# Patient Record
Sex: FEMALE | Race: ASIAN | NOT HISPANIC OR LATINO | Employment: FULL TIME | URBAN - METROPOLITAN AREA
[De-identification: names, ages, dates, MRNs, and addresses within clinical notes are randomized per-mention and may not be internally consistent; named-entity substitution may affect disease eponyms.]

---

## 2019-02-26 PROBLEM — J02.9 PHARYNGITIS: Status: ACTIVE | Noted: 2019-02-26

## 2019-02-26 PROBLEM — J03.90 EXUDATIVE TONSILLITIS: Status: ACTIVE | Noted: 2019-02-26

## 2023-10-23 ENCOUNTER — HOSPITAL ENCOUNTER (EMERGENCY)
Facility: HOSPITAL | Age: 48
Discharge: HOME | End: 2023-10-23
Attending: EMERGENCY MEDICINE | Admitting: EMERGENCY MEDICINE
Payer: COMMERCIAL

## 2023-10-23 ENCOUNTER — APPOINTMENT (OUTPATIENT)
Dept: CT IMAGING | Facility: HOSPITAL | Age: 48
End: 2023-10-23
Payer: COMMERCIAL

## 2023-10-23 VITALS
BODY MASS INDEX: 41.41 KG/M2 | DIASTOLIC BLOOD PRESSURE: 86 MMHG | RESPIRATION RATE: 18 BRPM | SYSTOLIC BLOOD PRESSURE: 132 MMHG | WEIGHT: 233.69 LBS | TEMPERATURE: 97.7 F | HEIGHT: 63 IN | OXYGEN SATURATION: 95 % | HEART RATE: 88 BPM

## 2023-10-23 DIAGNOSIS — N20.0 KIDNEY STONE: Primary | ICD-10-CM

## 2023-10-23 LAB
ALBUMIN SERPL-MCNC: 3.9 G/DL (ref 3.5–5.2)
ALBUMIN/GLOB SERPL: 1.2 G/DL
ALP SERPL-CCNC: 68 U/L (ref 39–117)
ALT SERPL W P-5'-P-CCNC: 20 U/L (ref 1–33)
ANION GAP SERPL CALCULATED.3IONS-SCNC: 11 MMOL/L (ref 5–15)
AST SERPL-CCNC: 21 U/L (ref 1–32)
BACTERIA UR QL AUTO: ABNORMAL /HPF
BASOPHILS # BLD AUTO: 0.02 10*3/MM3 (ref 0–0.2)
BASOPHILS NFR BLD AUTO: 0.3 % (ref 0–1.5)
BILIRUB SERPL-MCNC: 0.3 MG/DL (ref 0–1.2)
BILIRUB UR QL STRIP: NEGATIVE
BUN SERPL-MCNC: 13 MG/DL (ref 6–20)
BUN/CREAT SERPL: 18.3 (ref 7–25)
CALCIUM SPEC-SCNC: 9.1 MG/DL (ref 8.6–10.5)
CHLORIDE SERPL-SCNC: 107 MMOL/L (ref 98–107)
CLARITY UR: ABNORMAL
CO2 SERPL-SCNC: 23 MMOL/L (ref 22–29)
COLOR UR: YELLOW
CREAT SERPL-MCNC: 0.71 MG/DL (ref 0.57–1)
D-LACTATE SERPL-SCNC: 6.4 MMOL/L (ref 0.5–2)
DEPRECATED RDW RBC AUTO: 44 FL (ref 37–54)
EGFRCR SERPLBLD CKD-EPI 2021: 103.7 ML/MIN/1.73
EOSINOPHIL # BLD AUTO: 0 10*3/MM3 (ref 0–0.4)
EOSINOPHIL NFR BLD AUTO: 0 % (ref 0.3–6.2)
ERYTHROCYTE [DISTWIDTH] IN BLOOD BY AUTOMATED COUNT: 13.2 % (ref 12.3–15.4)
GLOBULIN UR ELPH-MCNC: 3.2 GM/DL
GLUCOSE SERPL-MCNC: 120 MG/DL (ref 65–99)
GLUCOSE UR STRIP-MCNC: NEGATIVE MG/DL
HCT VFR BLD AUTO: 36.8 % (ref 34–46.6)
HGB BLD-MCNC: 12.2 G/DL (ref 12–15.9)
HGB UR QL STRIP.AUTO: ABNORMAL
HOLD SPECIMEN: NORMAL
HYALINE CASTS UR QL AUTO: ABNORMAL /LPF
IMM GRANULOCYTES # BLD AUTO: 0.04 10*3/MM3 (ref 0–0.05)
IMM GRANULOCYTES NFR BLD AUTO: 0.6 % (ref 0–0.5)
KETONES UR QL STRIP: ABNORMAL
LEUKOCYTE ESTERASE UR QL STRIP.AUTO: NEGATIVE
LIPASE SERPL-CCNC: 29 U/L (ref 13–60)
LYMPHOCYTES # BLD AUTO: 0.65 10*3/MM3 (ref 0.7–3.1)
LYMPHOCYTES NFR BLD AUTO: 9.5 % (ref 19.6–45.3)
MCH RBC QN AUTO: 30.3 PG (ref 26.6–33)
MCHC RBC AUTO-ENTMCNC: 33.2 G/DL (ref 31.5–35.7)
MCV RBC AUTO: 91.5 FL (ref 79–97)
MONOCYTES # BLD AUTO: 0.32 10*3/MM3 (ref 0.1–0.9)
MONOCYTES NFR BLD AUTO: 4.7 % (ref 5–12)
NEUTROPHILS NFR BLD AUTO: 5.78 10*3/MM3 (ref 1.7–7)
NEUTROPHILS NFR BLD AUTO: 84.9 % (ref 42.7–76)
NITRITE UR QL STRIP: NEGATIVE
NRBC BLD AUTO-RTO: 0 /100 WBC (ref 0–0.2)
PH UR STRIP.AUTO: 5.5 [PH] (ref 5–8)
PLATELET # BLD AUTO: 159 10*3/MM3 (ref 140–450)
PMV BLD AUTO: 10.2 FL (ref 6–12)
POTASSIUM SERPL-SCNC: 3.4 MMOL/L (ref 3.5–5.2)
PROT SERPL-MCNC: 7.1 G/DL (ref 6–8.5)
PROT UR QL STRIP: ABNORMAL
RBC # BLD AUTO: 4.02 10*6/MM3 (ref 3.77–5.28)
RBC # UR STRIP: ABNORMAL /HPF
REF LAB TEST METHOD: ABNORMAL
SODIUM SERPL-SCNC: 141 MMOL/L (ref 136–145)
SP GR UR STRIP: 1.03 (ref 1–1.03)
SQUAMOUS #/AREA URNS HPF: ABNORMAL /HPF
UROBILINOGEN UR QL STRIP: ABNORMAL
WBC # UR STRIP: ABNORMAL /HPF
WBC NRBC COR # BLD: 6.81 10*3/MM3 (ref 3.4–10.8)
WHOLE BLOOD HOLD SPECIMEN: NORMAL

## 2023-10-23 PROCEDURE — 25010000002 ONDANSETRON PER 1 MG: Performed by: EMERGENCY MEDICINE

## 2023-10-23 PROCEDURE — 85025 COMPLETE CBC W/AUTO DIFF WBC: CPT | Performed by: EMERGENCY MEDICINE

## 2023-10-23 PROCEDURE — 96374 THER/PROPH/DIAG INJ IV PUSH: CPT

## 2023-10-23 PROCEDURE — 83605 ASSAY OF LACTIC ACID: CPT | Performed by: EMERGENCY MEDICINE

## 2023-10-23 PROCEDURE — 25010000002 MORPHINE PER 10 MG: Performed by: EMERGENCY MEDICINE

## 2023-10-23 PROCEDURE — 81001 URINALYSIS AUTO W/SCOPE: CPT | Performed by: EMERGENCY MEDICINE

## 2023-10-23 PROCEDURE — 83690 ASSAY OF LIPASE: CPT | Performed by: EMERGENCY MEDICINE

## 2023-10-23 PROCEDURE — 96375 TX/PRO/DX INJ NEW DRUG ADDON: CPT

## 2023-10-23 PROCEDURE — 96376 TX/PRO/DX INJ SAME DRUG ADON: CPT

## 2023-10-23 PROCEDURE — 99284 EMERGENCY DEPT VISIT MOD MDM: CPT

## 2023-10-23 PROCEDURE — 74176 CT ABD & PELVIS W/O CONTRAST: CPT

## 2023-10-23 PROCEDURE — 25810000003 LACTATED RINGERS SOLUTION: Performed by: EMERGENCY MEDICINE

## 2023-10-23 PROCEDURE — 80053 COMPREHEN METABOLIC PANEL: CPT | Performed by: EMERGENCY MEDICINE

## 2023-10-23 PROCEDURE — 36415 COLL VENOUS BLD VENIPUNCTURE: CPT

## 2023-10-23 PROCEDURE — 25010000002 KETOROLAC TROMETHAMINE PER 15 MG: Performed by: EMERGENCY MEDICINE

## 2023-10-23 RX ORDER — SODIUM CHLORIDE 9 MG/ML
10 INJECTION INTRAVENOUS AS NEEDED
Status: DISCONTINUED | OUTPATIENT
Start: 2023-10-23 | End: 2023-10-23 | Stop reason: HOSPADM

## 2023-10-23 RX ORDER — MORPHINE SULFATE 4 MG/ML
4 INJECTION, SOLUTION INTRAMUSCULAR; INTRAVENOUS ONCE
Status: COMPLETED | OUTPATIENT
Start: 2023-10-23 | End: 2023-10-23

## 2023-10-23 RX ORDER — ONDANSETRON 4 MG/1
4 TABLET, ORALLY DISINTEGRATING ORAL EVERY 6 HOURS PRN
Qty: 9 TABLET | Refills: 0 | Status: SHIPPED | OUTPATIENT
Start: 2023-10-23

## 2023-10-23 RX ORDER — ONDANSETRON 2 MG/ML
4 INJECTION INTRAMUSCULAR; INTRAVENOUS ONCE
Status: COMPLETED | OUTPATIENT
Start: 2023-10-23 | End: 2023-10-23

## 2023-10-23 RX ORDER — OXYCODONE HYDROCHLORIDE 5 MG/1
5 TABLET ORAL EVERY 6 HOURS PRN
Qty: 12 TABLET | Refills: 0 | Status: SHIPPED | OUTPATIENT
Start: 2023-10-23 | End: 2023-10-26

## 2023-10-23 RX ORDER — TAMSULOSIN HYDROCHLORIDE 0.4 MG/1
1 CAPSULE ORAL DAILY
Qty: 30 CAPSULE | Refills: 0 | Status: SHIPPED | OUTPATIENT
Start: 2023-10-23

## 2023-10-23 RX ORDER — KETOROLAC TROMETHAMINE 15 MG/ML
15 INJECTION, SOLUTION INTRAMUSCULAR; INTRAVENOUS ONCE
Status: COMPLETED | OUTPATIENT
Start: 2023-10-23 | End: 2023-10-23

## 2023-10-23 RX ADMIN — MORPHINE SULFATE 4 MG: 4 INJECTION, SOLUTION INTRAMUSCULAR; INTRAVENOUS at 06:37

## 2023-10-23 RX ADMIN — KETOROLAC TROMETHAMINE 15 MG: 15 INJECTION, SOLUTION INTRAMUSCULAR; INTRAVENOUS at 04:13

## 2023-10-23 RX ADMIN — MORPHINE SULFATE 4 MG: 4 INJECTION, SOLUTION INTRAMUSCULAR; INTRAVENOUS at 03:42

## 2023-10-23 RX ADMIN — SODIUM CHLORIDE, POTASSIUM CHLORIDE, SODIUM LACTATE AND CALCIUM CHLORIDE 1000 ML: 600; 310; 30; 20 INJECTION, SOLUTION INTRAVENOUS at 04:13

## 2023-10-23 RX ADMIN — ONDANSETRON 4 MG: 2 INJECTION INTRAMUSCULAR; INTRAVENOUS at 03:42

## 2023-10-23 NOTE — Clinical Note
[facility] EMERGENCY DEPARTMENT  [address]  [facility] [address]  Phone: [phone]    Bia Sims was seen and treated in our emergency department on 10/23/2023.  She may return to work on 10/25/2023.         Thank you for choosing [facility].    Lupillo Thorne MD

## 2023-10-23 NOTE — Clinical Note
[facility] EMERGENCY DEPARTMENT  [address]  [facility] [address]  Phone: [phone]    Bia Sims was seen and treated in our emergency department on 10/23/2023.  She may return to work on 10/25/2023.         Thank you for choosing [facility].    Lupillo Thorne MD

## 2023-10-23 NOTE — DISCHARGE INSTRUCTIONS
Today you were diagnosed with a kidney stone in the emergency department.  It is likely that the stone will pass through your urine stream.  Until it does you are likely to have episodic pain, nausea.   I recommend you take Tylenol 650 mg every 6 hours and ibuprofen 400 mg every 6 hours as needed for pain unless you have a contraindication to these medications  Take prescribed oxycodone as needed for severe episodes of pain.  Take prescribed ondansetron as needed for nausea or vomiting.  Take prescribed tamsulosin to help stone pass.  Call to schedule followup appointment with a Urologist.  Return to the ER as needed for new or worsening symptoms, especially if you develop signs of an infection.

## 2023-10-23 NOTE — ED PROVIDER NOTES
Subjective   History of Present Illness  Patient presents for evaluation of acute onset left-sided flank pain that is severe, waxing and waning over the past couple of hours.  She has had nausea and vomiting.  History of similar symptoms a couple of decades ago unclear cause at that time.  No dysuria, hematuria, fevers.    History provided by:  Patient      Review of Systems    No past medical history on file.    Allergies   Allergen Reactions    Vancomycin Anaphylaxis       No past surgical history on file.    No family history on file.    Social History     Socioeconomic History    Marital status:    Tobacco Use    Smoking status: Never   Substance and Sexual Activity    Alcohol use: No    Drug use: Defer    Sexual activity: Defer           Objective   Physical Exam  Constitutional:       General: She is not in acute distress.  HENT:      Head: Normocephalic and atraumatic.   Eyes:      Conjunctiva/sclera: Conjunctivae normal.      Pupils: Pupils are equal, round, and reactive to light.   Cardiovascular:      Rate and Rhythm: Normal rate and regular rhythm.      Pulses: Normal pulses.      Heart sounds: No murmur heard.     No gallop.   Pulmonary:      Effort: Pulmonary effort is normal. No respiratory distress.   Abdominal:      General: Abdomen is flat. There is no distension.      Tenderness: There is no abdominal tenderness. There is left CVA tenderness. There is no right CVA tenderness.   Musculoskeletal:         General: No swelling or deformity. Normal range of motion.   Skin:     General: Skin is warm and dry.      Capillary Refill: Capillary refill takes less than 2 seconds.   Neurological:      General: No focal deficit present.      Mental Status: She is alert and oriented to person, place, and time.   Psychiatric:         Mood and Affect: Mood normal.         Behavior: Behavior normal.         Procedures           ED Course                                           Medical Decision  Making  Differential diagnosis includes kidney stone, urinary tract infection, diverticulitis.  Lab and imaging studies conducted.  1 L IV fluid bolus, 4 mg IV morphine, 4 mg IV Zofran given for symptom control.    After multiple rounds of medications patient's pain is adequately controlled.  She is feeling much improved.  She was counseled on passage of stone, return precautions to the ER, follow-up with urologist.  Prescriptions were sent to her pharmacy.  She was discharged in good condition    Problems Addressed:  Kidney stone: complicated acute illness or injury    Amount and/or Complexity of Data Reviewed  Labs: ordered.     Details: Laboratory work-up independently interpreted by myself demonstrates no significant abnormalities.  Normal renal function.  No evidence of urinary tract infection  Radiology: ordered and independent interpretation performed.     Details: CT scan of the abdomen and pelvis independently interpreted by myself demonstrates 4 mm ureteral stone.    Risk  Prescription drug management.  Parenteral controlled substances.        Final diagnoses:   Kidney stone       ED Disposition  ED Disposition       ED Disposition   Discharge    Condition   Stable    Comment   --             Recent Results (from the past 24 hour(s))   Comprehensive Metabolic Panel    Collection Time: 10/23/23  4:20 AM    Specimen: Blood   Result Value Ref Range    Glucose 120 (H) 65 - 99 mg/dL    BUN 13 6 - 20 mg/dL    Creatinine 0.71 0.57 - 1.00 mg/dL    Sodium 141 136 - 145 mmol/L    Potassium 3.4 (L) 3.5 - 5.2 mmol/L    Chloride 107 98 - 107 mmol/L    CO2 23.0 22.0 - 29.0 mmol/L    Calcium 9.1 8.6 - 10.5 mg/dL    Total Protein 7.1 6.0 - 8.5 g/dL    Albumin 3.9 3.5 - 5.2 g/dL    ALT (SGPT) 20 1 - 33 U/L    AST (SGOT) 21 1 - 32 U/L    Alkaline Phosphatase 68 39 - 117 U/L    Total Bilirubin 0.3 0.0 - 1.2 mg/dL    Globulin 3.2 gm/dL    A/G Ratio 1.2 g/dL    BUN/Creatinine Ratio 18.3 7.0 - 25.0    Anion Gap 11.0 5.0 - 15.0  mmol/L    eGFR 103.7 >60.0 mL/min/1.73   Lipase    Collection Time: 10/23/23  4:20 AM    Specimen: Blood   Result Value Ref Range    Lipase 29 13 - 60 U/L   Green Top (Gel)    Collection Time: 10/23/23  4:20 AM   Result Value Ref Range    Extra Tube Hold for add-ons.    Lavender Top    Collection Time: 10/23/23  4:20 AM   Result Value Ref Range    Extra Tube hold for add-on    Gold Top - SST    Collection Time: 10/23/23  4:20 AM   Result Value Ref Range    Extra Tube Hold for add-ons.    CBC Auto Differential    Collection Time: 10/23/23  5:01 AM    Specimen: Blood   Result Value Ref Range    WBC 6.81 3.40 - 10.80 10*3/mm3    RBC 4.02 3.77 - 5.28 10*6/mm3    Hemoglobin 12.2 12.0 - 15.9 g/dL    Hematocrit 36.8 34.0 - 46.6 %    MCV 91.5 79.0 - 97.0 fL    MCH 30.3 26.6 - 33.0 pg    MCHC 33.2 31.5 - 35.7 g/dL    RDW 13.2 12.3 - 15.4 %    RDW-SD 44.0 37.0 - 54.0 fl    MPV 10.2 6.0 - 12.0 fL    Platelets 159 140 - 450 10*3/mm3    Neutrophil % 84.9 (H) 42.7 - 76.0 %    Lymphocyte % 9.5 (L) 19.6 - 45.3 %    Monocyte % 4.7 (L) 5.0 - 12.0 %    Eosinophil % 0.0 (L) 0.3 - 6.2 %    Basophil % 0.3 0.0 - 1.5 %    Immature Grans % 0.6 (H) 0.0 - 0.5 %    Neutrophils, Absolute 5.78 1.70 - 7.00 10*3/mm3    Lymphocytes, Absolute 0.65 (L) 0.70 - 3.10 10*3/mm3    Monocytes, Absolute 0.32 0.10 - 0.90 10*3/mm3    Eosinophils, Absolute 0.00 0.00 - 0.40 10*3/mm3    Basophils, Absolute 0.02 0.00 - 0.20 10*3/mm3    Immature Grans, Absolute 0.04 0.00 - 0.05 10*3/mm3    nRBC 0.0 0.0 - 0.2 /100 WBC   Urinalysis With Microscopic If Indicated (No Culture) - Urine, Clean Catch    Collection Time: 10/23/23  5:47 AM    Specimen: Urine, Clean Catch   Result Value Ref Range    Color, UA Yellow Yellow, Straw    Appearance, UA Cloudy (A) Clear    pH, UA 5.5 5.0 - 8.0    Specific Gravity, UA 1.029 1.001 - 1.030    Glucose, UA Negative Negative    Ketones, UA Trace (A) Negative    Bilirubin, UA Negative Negative    Blood, UA Large (3+) (A) Negative     "Protein, UA 30 mg/dL (1+) (A) Negative    Leuk Esterase, UA Negative Negative    Nitrite, UA Negative Negative    Urobilinogen, UA 0.2 E.U./dL 0.2 - 1.0 E.U./dL   Urinalysis, Microscopic Only - Urine, Clean Catch    Collection Time: 10/23/23  5:47 AM    Specimen: Urine, Clean Catch   Result Value Ref Range    RBC, UA 0-2 None Seen, 0-2 /HPF    WBC, UA 3-5 (A) None Seen, 0-2 /HPF    Bacteria, UA None Seen None Seen, Trace /HPF    Squamous Epithelial Cells, UA 0-2 None Seen, 0-2 /HPF    Hyaline Casts, UA 7-12 0 - 6 /LPF    Methodology Automated Microscopy      Note: In addition to lab results from this visit, the labs listed above may include labs taken at another facility or during a different encounter within the last 24 hours. Please correlate lab times with ED admission and discharge times for further clarification of the services performed during this visit.    CT Abdomen Pelvis Without Contrast   Final Result   Impression:   Mild left hydronephrosis secondary to a 4 mm left UPJ stone.                  Electronically Signed: Oleksandr Lemus MD     10/23/2023 3:38 AM EDT     Workstation ID: [id number]        Vitals:    10/23/23 0304 10/23/23 0305 10/23/23 0307 10/23/23 0630   BP:    132/86   Pulse: 82   88   Resp:   17 18   Temp:  97.7 °F (36.5 °C)     TempSrc:  Oral     SpO2: 100%   95%   Weight:  106 kg (233 lb 11 oz)     Height:  160 cm (63\")       Medications   Sodium Chloride (PF) 0.9 % 10 mL (has no administration in time range)   lactated ringers bolus 1,000 mL (1,000 mL Intravenous New Bag 10/23/23 0413)   ondansetron (ZOFRAN) injection 4 mg (4 mg Intravenous Given 10/23/23 0342)   Morphine sulfate (PF) injection 4 mg (4 mg Intravenous Given 10/23/23 0342)   ketorolac (TORADOL) injection 15 mg (15 mg Intravenous Given 10/23/23 0413)   Morphine sulfate (PF) injection 4 mg (4 mg Intravenous Given 10/23/23 0637)     ECG/EMG Results (last 24 hours)       ** No results found for the last 24 hours. **          No " orders to display           Pradeep Cerad MD  [address]  [address]  [place] [id number] [phone]               Medication List        New Prescriptions      ondansetron ODT 4 MG disintegrating tablet  Commonly known as: ZOFRAN-ODT  Place 1 tablet on the tongue Every 6 (Six) Hours As Needed for Nausea or Vomiting.     oxyCODONE 5 MG immediate release tablet  Commonly known as: Roxicodone  Take 1 tablet by mouth Every 6 (Six) Hours As Needed for Severe Pain for up to 3 days.     tamsulosin 0.4 MG capsule 24 hr capsule  Commonly known as: FLOMAX  Take 1 capsule by mouth Daily.               Where to Get Your Medications        These medications were sent to [facility] [id number] - [place], [place] - [address] AT [facility] [address] - [phone]  - [phone] FX  [address] [facility] [address] [facility] [address]      Phone: [phone]   ondansetron ODT 4 MG disintegrating tablet  oxyCODONE 5 MG immediate release tablet  tamsulosin 0.4 MG capsule 24 hr capsule            Lupillo Thorne MD  10/23/23 0607

## 2023-11-26 ENCOUNTER — APPOINTMENT (OUTPATIENT)
Dept: CT IMAGING | Facility: HOSPITAL | Age: 48
End: 2023-11-26
Payer: COMMERCIAL

## 2023-11-26 ENCOUNTER — HOSPITAL ENCOUNTER (OUTPATIENT)
Facility: HOSPITAL | Age: 48
Discharge: HOME | End: 2023-11-28
Attending: EMERGENCY MEDICINE | Admitting: INTERNAL MEDICINE
Payer: COMMERCIAL

## 2023-11-26 DIAGNOSIS — Z74.09 IMPAIRED FUNCTIONAL MOBILITY, BALANCE, GAIT, AND ENDURANCE: ICD-10-CM

## 2023-11-26 DIAGNOSIS — R10.9 LEFT FLANK PAIN: Primary | ICD-10-CM

## 2023-11-26 DIAGNOSIS — R11.2 NAUSEA AND VOMITING, UNSPECIFIED VOMITING TYPE: ICD-10-CM

## 2023-11-26 DIAGNOSIS — R03.0 ELEVATED BLOOD PRESSURE READING: ICD-10-CM

## 2023-11-26 DIAGNOSIS — N20.1 URETEROLITHIASIS: ICD-10-CM

## 2023-11-26 DIAGNOSIS — N20.1 LEFT URETERAL STONE: ICD-10-CM

## 2023-11-26 LAB
ALBUMIN SERPL-MCNC: 4.5 G/DL (ref 3.5–5.2)
ALBUMIN/GLOB SERPL: 1.4 G/DL
ALP SERPL-CCNC: 83 U/L (ref 39–117)
ALT SERPL W P-5'-P-CCNC: 21 U/L (ref 1–33)
ANION GAP SERPL CALCULATED.3IONS-SCNC: 16 MMOL/L (ref 5–15)
AST SERPL-CCNC: 23 U/L (ref 1–32)
BASOPHILS # BLD AUTO: 0.04 10*3/MM3 (ref 0–0.2)
BASOPHILS NFR BLD AUTO: 0.3 % (ref 0–1.5)
BILIRUB SERPL-MCNC: 0.4 MG/DL (ref 0–1.2)
BUN SERPL-MCNC: 12 MG/DL (ref 6–20)
BUN/CREAT SERPL: 13.5 (ref 7–25)
CALCIUM SPEC-SCNC: 9.9 MG/DL (ref 8.6–10.5)
CHLORIDE SERPL-SCNC: 103 MMOL/L (ref 98–107)
CO2 SERPL-SCNC: 21 MMOL/L (ref 22–29)
CREAT SERPL-MCNC: 0.89 MG/DL (ref 0.57–1)
D-LACTATE SERPL-SCNC: 3.7 MMOL/L (ref 0.5–2)
DEPRECATED RDW RBC AUTO: 42 FL (ref 37–54)
EGFRCR SERPLBLD CKD-EPI 2021: 79.1 ML/MIN/1.73
EOSINOPHIL # BLD AUTO: 0.03 10*3/MM3 (ref 0–0.4)
EOSINOPHIL NFR BLD AUTO: 0.2 % (ref 0.3–6.2)
ERYTHROCYTE [DISTWIDTH] IN BLOOD BY AUTOMATED COUNT: 13 % (ref 12.3–15.4)
GLOBULIN UR ELPH-MCNC: 3.2 GM/DL
GLUCOSE SERPL-MCNC: 129 MG/DL (ref 65–99)
HCT VFR BLD AUTO: 43.5 % (ref 34–46.6)
HGB BLD-MCNC: 16 G/DL (ref 12–15.9)
HOLD SPECIMEN: NORMAL
HOLD SPECIMEN: NORMAL
IMM GRANULOCYTES # BLD AUTO: 0.04 10*3/MM3 (ref 0–0.05)
IMM GRANULOCYTES NFR BLD AUTO: 0.3 % (ref 0–0.5)
LIPASE SERPL-CCNC: 24 U/L (ref 13–60)
LYMPHOCYTES # BLD AUTO: 1.55 10*3/MM3 (ref 0.7–3.1)
LYMPHOCYTES NFR BLD AUTO: 12.8 % (ref 19.6–45.3)
MCH RBC QN AUTO: 32.6 PG (ref 26.6–33)
MCHC RBC AUTO-ENTMCNC: 36.8 G/DL (ref 31.5–35.7)
MCV RBC AUTO: 88.6 FL (ref 79–97)
MONOCYTES # BLD AUTO: 0.64 10*3/MM3 (ref 0.1–0.9)
MONOCYTES NFR BLD AUTO: 5.3 % (ref 5–12)
NEUTROPHILS NFR BLD AUTO: 81.1 % (ref 42.7–76)
NEUTROPHILS NFR BLD AUTO: 9.84 10*3/MM3 (ref 1.7–7)
NRBC BLD AUTO-RTO: 0 /100 WBC (ref 0–0.2)
PLATELET # BLD AUTO: 242 10*3/MM3 (ref 140–450)
PMV BLD AUTO: 9.9 FL (ref 6–12)
POTASSIUM SERPL-SCNC: 3.7 MMOL/L (ref 3.5–5.2)
PROT SERPL-MCNC: 7.7 G/DL (ref 6–8.5)
RBC # BLD AUTO: 4.91 10*6/MM3 (ref 3.77–5.28)
SODIUM SERPL-SCNC: 140 MMOL/L (ref 136–145)
WBC NRBC COR # BLD AUTO: 12.14 10*3/MM3 (ref 3.4–10.8)
WHOLE BLOOD HOLD COAG: NORMAL
WHOLE BLOOD HOLD SPECIMEN: NORMAL

## 2023-11-26 PROCEDURE — 96375 TX/PRO/DX INJ NEW DRUG ADDON: CPT

## 2023-11-26 PROCEDURE — 80053 COMPREHEN METABOLIC PANEL: CPT | Performed by: EMERGENCY MEDICINE

## 2023-11-26 PROCEDURE — 36415 COLL VENOUS BLD VENIPUNCTURE: CPT

## 2023-11-26 PROCEDURE — 99284 EMERGENCY DEPT VISIT MOD MDM: CPT

## 2023-11-26 PROCEDURE — 25010000002 KETOROLAC TROMETHAMINE PER 15 MG: Performed by: EMERGENCY MEDICINE

## 2023-11-26 PROCEDURE — 83605 ASSAY OF LACTIC ACID: CPT | Performed by: EMERGENCY MEDICINE

## 2023-11-26 PROCEDURE — 25010000002 MORPHINE PER 10 MG: Performed by: EMERGENCY MEDICINE

## 2023-11-26 PROCEDURE — 25810000003 SODIUM CHLORIDE 0.9 % SOLUTION: Performed by: EMERGENCY MEDICINE

## 2023-11-26 PROCEDURE — 81001 URINALYSIS AUTO W/SCOPE: CPT | Performed by: EMERGENCY MEDICINE

## 2023-11-26 PROCEDURE — 85025 COMPLETE CBC W/AUTO DIFF WBC: CPT | Performed by: EMERGENCY MEDICINE

## 2023-11-26 PROCEDURE — 87086 URINE CULTURE/COLONY COUNT: CPT | Performed by: PHYSICIAN ASSISTANT

## 2023-11-26 PROCEDURE — 74176 CT ABD & PELVIS W/O CONTRAST: CPT

## 2023-11-26 PROCEDURE — 25010000002 ONDANSETRON PER 1 MG: Performed by: EMERGENCY MEDICINE

## 2023-11-26 PROCEDURE — 96374 THER/PROPH/DIAG INJ IV PUSH: CPT

## 2023-11-26 PROCEDURE — 83690 ASSAY OF LIPASE: CPT | Performed by: EMERGENCY MEDICINE

## 2023-11-26 RX ORDER — ONDANSETRON 2 MG/ML
4 INJECTION INTRAMUSCULAR; INTRAVENOUS ONCE
Status: COMPLETED | OUTPATIENT
Start: 2023-11-26 | End: 2023-11-26

## 2023-11-26 RX ORDER — MORPHINE SULFATE 4 MG/ML
4 INJECTION, SOLUTION INTRAMUSCULAR; INTRAVENOUS ONCE
Status: COMPLETED | OUTPATIENT
Start: 2023-11-26 | End: 2023-11-26

## 2023-11-26 RX ORDER — SODIUM CHLORIDE 9 MG/ML
10 INJECTION INTRAVENOUS AS NEEDED
Status: DISCONTINUED | OUTPATIENT
Start: 2023-11-26 | End: 2023-11-28 | Stop reason: HOSPADM

## 2023-11-26 RX ORDER — KETOROLAC TROMETHAMINE 30 MG/ML
30 INJECTION, SOLUTION INTRAMUSCULAR; INTRAVENOUS ONCE
Status: COMPLETED | OUTPATIENT
Start: 2023-11-26 | End: 2023-11-26

## 2023-11-26 RX ADMIN — SODIUM CHLORIDE 1000 ML: 9 INJECTION, SOLUTION INTRAVENOUS at 22:18

## 2023-11-26 RX ADMIN — KETOROLAC TROMETHAMINE 30 MG: 30 INJECTION, SOLUTION INTRAMUSCULAR; INTRAVENOUS at 22:18

## 2023-11-26 RX ADMIN — MORPHINE SULFATE 4 MG: 4 INJECTION, SOLUTION INTRAMUSCULAR; INTRAVENOUS at 22:18

## 2023-11-26 RX ADMIN — ONDANSETRON 4 MG: 2 INJECTION INTRAMUSCULAR; INTRAVENOUS at 22:18

## 2023-11-26 NOTE — Clinical Note
Level of Care: Telemetry [5]   Diagnosis: Left ureteral stone [2591325]   Admitting Physician: CHRISTA FITCH III [[id number]]   Attending Physician: CHRISTA FITCH III [[id number]]   Bed Request Comments: tele obs (CDU)

## 2023-11-27 ENCOUNTER — ANESTHESIA (OUTPATIENT)
Dept: PERIOP | Facility: HOSPITAL | Age: 48
End: 2023-11-27
Payer: COMMERCIAL

## 2023-11-27 ENCOUNTER — APPOINTMENT (OUTPATIENT)
Dept: GENERAL RADIOLOGY | Facility: HOSPITAL | Age: 48
End: 2023-11-27
Payer: COMMERCIAL

## 2023-11-27 ENCOUNTER — ANESTHESIA EVENT (OUTPATIENT)
Dept: PERIOP | Facility: HOSPITAL | Age: 48
End: 2023-11-27
Payer: COMMERCIAL

## 2023-11-27 PROBLEM — F32.A ANXIETY AND DEPRESSION: Status: ACTIVE | Noted: 2023-11-27

## 2023-11-27 PROBLEM — E87.20 LACTIC ACIDOSIS: Status: ACTIVE | Noted: 2023-11-27

## 2023-11-27 PROBLEM — Z85.3 HISTORY OF BREAST CANCER: Status: ACTIVE | Noted: 2023-11-27

## 2023-11-27 PROBLEM — G47.33 OSA (OBSTRUCTIVE SLEEP APNEA): Status: ACTIVE | Noted: 2023-11-27

## 2023-11-27 PROBLEM — F41.9 ANXIETY AND DEPRESSION: Status: ACTIVE | Noted: 2023-11-27

## 2023-11-27 PROBLEM — R09.02 HYPOXIA: Status: ACTIVE | Noted: 2023-11-27

## 2023-11-27 PROBLEM — N13.9 OBSTRUCTIVE UROPATHY: Status: ACTIVE | Noted: 2023-11-27

## 2023-11-27 PROBLEM — N20.1 LEFT URETERAL STONE: Status: ACTIVE | Noted: 2023-11-27

## 2023-11-27 LAB
ANION GAP SERPL CALCULATED.3IONS-SCNC: 11 MMOL/L (ref 5–15)
B-HCG UR QL: NEGATIVE
BACTERIA UR QL AUTO: ABNORMAL /HPF
BILIRUB UR QL STRIP: NEGATIVE
BUN SERPL-MCNC: 10 MG/DL (ref 6–20)
BUN/CREAT SERPL: 13.9 (ref 7–25)
CALCIUM SPEC-SCNC: 8.4 MG/DL (ref 8.6–10.5)
CHLORIDE SERPL-SCNC: 108 MMOL/L (ref 98–107)
CLARITY UR: CLEAR
CO2 SERPL-SCNC: 22 MMOL/L (ref 22–29)
COD CRY URNS QL: ABNORMAL /HPF
COLOR UR: YELLOW
CREAT SERPL-MCNC: 0.72 MG/DL (ref 0.57–1)
D-LACTATE SERPL-SCNC: 1.3 MMOL/L (ref 0.5–2)
D-LACTATE SERPL-SCNC: 2.4 MMOL/L (ref 0.5–2)
D-LACTATE SERPL-SCNC: 2.6 MMOL/L (ref 0.5–2)
D-LACTATE SERPL-SCNC: 2.7 MMOL/L (ref 0.5–2)
DEPRECATED RDW RBC AUTO: 43.6 FL (ref 37–54)
EGFRCR SERPLBLD CKD-EPI 2021: 102 ML/MIN/1.73
ERYTHROCYTE [DISTWIDTH] IN BLOOD BY AUTOMATED COUNT: 13.1 % (ref 12.3–15.4)
EXPIRATION DATE: NORMAL
GLUCOSE SERPL-MCNC: 113 MG/DL (ref 65–99)
GLUCOSE UR STRIP-MCNC: NEGATIVE MG/DL
HCT VFR BLD AUTO: 37.3 % (ref 34–46.6)
HGB BLD-MCNC: 12.4 G/DL (ref 12–15.9)
HGB UR QL STRIP.AUTO: ABNORMAL
HOLD SPECIMEN: NORMAL
HYALINE CASTS UR QL AUTO: ABNORMAL /LPF
INTERNAL NEGATIVE CONTROL: NORMAL
INTERNAL POSITIVE CONTROL: NORMAL
KETONES UR QL STRIP: ABNORMAL
LEUKOCYTE ESTERASE UR QL STRIP.AUTO: NEGATIVE
Lab: NORMAL
MCH RBC QN AUTO: 30.3 PG (ref 26.6–33)
MCHC RBC AUTO-ENTMCNC: 33.2 G/DL (ref 31.5–35.7)
MCV RBC AUTO: 91.2 FL (ref 79–97)
NITRITE UR QL STRIP: NEGATIVE
PH UR STRIP.AUTO: <=5 [PH] (ref 5–8)
PLATELET # BLD AUTO: 164 10*3/MM3 (ref 140–450)
PMV BLD AUTO: 9.8 FL (ref 6–12)
POTASSIUM SERPL-SCNC: 4.1 MMOL/L (ref 3.5–5.2)
PROT UR QL STRIP: ABNORMAL
RBC # BLD AUTO: 4.09 10*6/MM3 (ref 3.77–5.28)
RBC # UR STRIP: ABNORMAL /HPF
REF LAB TEST METHOD: ABNORMAL
SODIUM SERPL-SCNC: 141 MMOL/L (ref 136–145)
SP GR UR STRIP: 1.02 (ref 1–1.03)
SQUAMOUS #/AREA URNS HPF: ABNORMAL /HPF
UROBILINOGEN UR QL STRIP: ABNORMAL
WBC # UR STRIP: ABNORMAL /HPF
WBC NRBC COR # BLD AUTO: 7.21 10*3/MM3 (ref 3.4–10.8)

## 2023-11-27 PROCEDURE — G0378 HOSPITAL OBSERVATION PER HR: HCPCS

## 2023-11-27 PROCEDURE — 74420 UROGRAPHY RTRGR +-KUB: CPT | Performed by: UROLOGY

## 2023-11-27 PROCEDURE — 85027 COMPLETE CBC AUTOMATED: CPT | Performed by: PHYSICIAN ASSISTANT

## 2023-11-27 PROCEDURE — 25810000003 LACTATED RINGERS PER 1000 ML: Performed by: PHYSICIAN ASSISTANT

## 2023-11-27 PROCEDURE — 96376 TX/PRO/DX INJ SAME DRUG ADON: CPT

## 2023-11-27 PROCEDURE — 96361 HYDRATE IV INFUSION ADD-ON: CPT

## 2023-11-27 PROCEDURE — 25810000003 LACTATED RINGERS PER 1000 ML: Performed by: UROLOGY

## 2023-11-27 PROCEDURE — 80048 BASIC METABOLIC PNL TOTAL CA: CPT | Performed by: PHYSICIAN ASSISTANT

## 2023-11-27 PROCEDURE — 97161 PT EVAL LOW COMPLEX 20 MIN: CPT

## 2023-11-27 PROCEDURE — 93005 ELECTROCARDIOGRAM TRACING: CPT | Performed by: PHYSICIAN ASSISTANT

## 2023-11-27 PROCEDURE — C2617 STENT, NON-COR, TEM W/O DEL: HCPCS | Performed by: UROLOGY

## 2023-11-27 PROCEDURE — 52356 CYSTO/URETERO W/LITHOTRIPSY: CPT | Performed by: UROLOGY

## 2023-11-27 PROCEDURE — 99223 1ST HOSP IP/OBS HIGH 75: CPT | Performed by: PHYSICIAN ASSISTANT

## 2023-11-27 PROCEDURE — 25010000002 KETOROLAC TROMETHAMINE PER 15 MG: Performed by: PHYSICIAN ASSISTANT

## 2023-11-27 PROCEDURE — 81025 URINE PREGNANCY TEST: CPT | Performed by: ANESTHESIOLOGY

## 2023-11-27 PROCEDURE — C1769 GUIDE WIRE: HCPCS | Performed by: UROLOGY

## 2023-11-27 PROCEDURE — 25010000002 METOCLOPRAMIDE PER 10 MG: Performed by: EMERGENCY MEDICINE

## 2023-11-27 PROCEDURE — 25010000002 MORPHINE PER 10 MG: Performed by: UROLOGY

## 2023-11-27 PROCEDURE — 99222 1ST HOSP IP/OBS MODERATE 55: CPT | Performed by: UROLOGY

## 2023-11-27 PROCEDURE — 76000 FLUOROSCOPY <1 HR PHYS/QHP: CPT

## 2023-11-27 PROCEDURE — 93010 ELECTROCARDIOGRAM REPORT: CPT | Performed by: INTERNAL MEDICINE

## 2023-11-27 PROCEDURE — 25010000002 DEXAMETHASONE PER 1 MG: Performed by: ANESTHESIOLOGY

## 2023-11-27 PROCEDURE — 25010000002 CEFAZOLIN PER 500 MG: Performed by: UROLOGY

## 2023-11-27 PROCEDURE — 25010000002 CEFAZOLIN IN DEXTROSE 2-4 GM/100ML-% SOLUTION: Performed by: ANESTHESIOLOGY

## 2023-11-27 PROCEDURE — 25010000002 GENTAMICIN PER 80 MG: Performed by: UROLOGY

## 2023-11-27 PROCEDURE — 25010000002 PROPOFOL 10 MG/ML EMULSION: Performed by: ANESTHESIOLOGY

## 2023-11-27 PROCEDURE — 25010000002 MORPHINE PER 10 MG: Performed by: INTERNAL MEDICINE

## 2023-11-27 PROCEDURE — 96375 TX/PRO/DX INJ NEW DRUG ADDON: CPT

## 2023-11-27 PROCEDURE — 71045 X-RAY EXAM CHEST 1 VIEW: CPT

## 2023-11-27 PROCEDURE — 83605 ASSAY OF LACTIC ACID: CPT | Performed by: EMERGENCY MEDICINE

## 2023-11-27 PROCEDURE — 25810000003 LACTATED RINGERS PER 1000 ML: Performed by: INTERNAL MEDICINE

## 2023-11-27 PROCEDURE — 87040 BLOOD CULTURE FOR BACTERIA: CPT | Performed by: PHYSICIAN ASSISTANT

## 2023-11-27 PROCEDURE — 25810000003 LACTATED RINGERS SOLUTION: Performed by: INTERNAL MEDICINE

## 2023-11-27 PROCEDURE — 25010000002 HYDROMORPHONE PER 4 MG: Performed by: EMERGENCY MEDICINE

## 2023-11-27 PROCEDURE — 25810000003 LACTATED RINGERS PER 1000 ML: Performed by: ANESTHESIOLOGY

## 2023-11-27 PROCEDURE — 25810000003 SODIUM CHLORIDE 0.9 % SOLUTION: Performed by: EMERGENCY MEDICINE

## 2023-11-27 PROCEDURE — 25510000001 IOPAMIDOL 61 % SOLUTION 50 ML VIAL: Performed by: UROLOGY

## 2023-11-27 DEVICE — URETERAL STENT
Type: IMPLANTABLE DEVICE | Site: URETER | Status: FUNCTIONAL
Brand: PERCUFLEX™ PLUS

## 2023-11-27 RX ORDER — MAGNESIUM HYDROXIDE 1200 MG/15ML
LIQUID ORAL AS NEEDED
Status: DISCONTINUED | OUTPATIENT
Start: 2023-11-27 | End: 2023-11-27 | Stop reason: HOSPADM

## 2023-11-27 RX ORDER — BISACODYL 5 MG/1
5 TABLET, DELAYED RELEASE ORAL DAILY PRN
Status: DISCONTINUED | OUTPATIENT
Start: 2023-11-27 | End: 2023-11-28 | Stop reason: HOSPADM

## 2023-11-27 RX ORDER — MORPHINE SULFATE 4 MG/ML
3 INJECTION, SOLUTION INTRAMUSCULAR; INTRAVENOUS
Status: DISCONTINUED | OUTPATIENT
Start: 2023-11-27 | End: 2023-11-28 | Stop reason: HOSPADM

## 2023-11-27 RX ORDER — LIDOCAINE HYDROCHLORIDE 20 MG/ML
JELLY TOPICAL AS NEEDED
Status: DISCONTINUED | OUTPATIENT
Start: 2023-11-27 | End: 2023-11-27 | Stop reason: HOSPADM

## 2023-11-27 RX ORDER — PHENAZOPYRIDINE HYDROCHLORIDE 100 MG/1
200 TABLET, FILM COATED ORAL EVERY 8 HOURS PRN
Status: DISCONTINUED | OUTPATIENT
Start: 2023-11-27 | End: 2023-11-28 | Stop reason: HOSPADM

## 2023-11-27 RX ORDER — TAMSULOSIN HYDROCHLORIDE 0.4 MG/1
0.4 CAPSULE ORAL DAILY
Status: DISCONTINUED | OUTPATIENT
Start: 2023-11-27 | End: 2023-11-28 | Stop reason: HOSPADM

## 2023-11-27 RX ORDER — METOCLOPRAMIDE HYDROCHLORIDE 5 MG/ML
10 INJECTION INTRAMUSCULAR; INTRAVENOUS ONCE
Status: COMPLETED | OUTPATIENT
Start: 2023-11-27 | End: 2023-11-27

## 2023-11-27 RX ORDER — FAMOTIDINE 20 MG/1
20 TABLET, FILM COATED ORAL
Status: COMPLETED | OUTPATIENT
Start: 2023-11-27 | End: 2023-11-27

## 2023-11-27 RX ORDER — SODIUM CHLORIDE 0.9 % (FLUSH) 0.9 %
10 SYRINGE (ML) INJECTION EVERY 12 HOURS SCHEDULED
Status: DISCONTINUED | OUTPATIENT
Start: 2023-11-27 | End: 2023-11-27 | Stop reason: HOSPADM

## 2023-11-27 RX ORDER — POLYETHYLENE GLYCOL 3350 17 G/17G
17 POWDER, FOR SOLUTION ORAL DAILY PRN
Status: DISCONTINUED | OUTPATIENT
Start: 2023-11-27 | End: 2023-11-28 | Stop reason: HOSPADM

## 2023-11-27 RX ORDER — MIDAZOLAM HYDROCHLORIDE 1 MG/ML
1 INJECTION INTRAMUSCULAR; INTRAVENOUS
Status: DISCONTINUED | OUTPATIENT
Start: 2023-11-27 | End: 2023-11-27 | Stop reason: HOSPADM

## 2023-11-27 RX ORDER — OXYBUTYNIN CHLORIDE 5 MG/1
5 TABLET ORAL EVERY 8 HOURS PRN
Status: DISCONTINUED | OUTPATIENT
Start: 2023-11-27 | End: 2023-11-28 | Stop reason: HOSPADM

## 2023-11-27 RX ORDER — LIDOCAINE HYDROCHLORIDE 10 MG/ML
INJECTION, SOLUTION EPIDURAL; INFILTRATION; INTRACAUDAL; PERINEURAL AS NEEDED
Status: DISCONTINUED | OUTPATIENT
Start: 2023-11-27 | End: 2023-11-27 | Stop reason: SURG

## 2023-11-27 RX ORDER — SODIUM CHLORIDE 0.9 % (FLUSH) 0.9 %
10 SYRINGE (ML) INJECTION AS NEEDED
Status: DISCONTINUED | OUTPATIENT
Start: 2023-11-27 | End: 2023-11-28 | Stop reason: HOSPADM

## 2023-11-27 RX ORDER — ONDANSETRON 2 MG/ML
4 INJECTION INTRAMUSCULAR; INTRAVENOUS EVERY 6 HOURS PRN
Status: DISCONTINUED | OUTPATIENT
Start: 2023-11-27 | End: 2023-11-28 | Stop reason: HOSPADM

## 2023-11-27 RX ORDER — SERTRALINE HYDROCHLORIDE 25 MG/1
75 TABLET, FILM COATED ORAL DAILY
COMMUNITY

## 2023-11-27 RX ORDER — AMOXICILLIN 250 MG
2 CAPSULE ORAL 2 TIMES DAILY
Status: DISCONTINUED | OUTPATIENT
Start: 2023-11-27 | End: 2023-11-28 | Stop reason: HOSPADM

## 2023-11-27 RX ORDER — SODIUM CHLORIDE 9 MG/ML
40 INJECTION, SOLUTION INTRAVENOUS AS NEEDED
Status: DISCONTINUED | OUTPATIENT
Start: 2023-11-27 | End: 2023-11-28 | Stop reason: HOSPADM

## 2023-11-27 RX ORDER — SODIUM CHLORIDE, SODIUM LACTATE, POTASSIUM CHLORIDE, CALCIUM CHLORIDE 600; 310; 30; 20 MG/100ML; MG/100ML; MG/100ML; MG/100ML
75 INJECTION, SOLUTION INTRAVENOUS CONTINUOUS
Status: DISCONTINUED | OUTPATIENT
Start: 2023-11-27 | End: 2023-11-27

## 2023-11-27 RX ORDER — PROPOFOL 10 MG/ML
VIAL (ML) INTRAVENOUS AS NEEDED
Status: DISCONTINUED | OUTPATIENT
Start: 2023-11-27 | End: 2023-11-27 | Stop reason: SURG

## 2023-11-27 RX ORDER — SODIUM CHLORIDE 0.9 % (FLUSH) 0.9 %
10 SYRINGE (ML) INJECTION EVERY 12 HOURS SCHEDULED
Status: DISCONTINUED | OUTPATIENT
Start: 2023-11-27 | End: 2023-11-28 | Stop reason: HOSPADM

## 2023-11-27 RX ORDER — SODIUM CHLORIDE, SODIUM LACTATE, POTASSIUM CHLORIDE, CALCIUM CHLORIDE 600; 310; 30; 20 MG/100ML; MG/100ML; MG/100ML; MG/100ML
9 INJECTION, SOLUTION INTRAVENOUS CONTINUOUS PRN
Status: DISCONTINUED | OUTPATIENT
Start: 2023-11-27 | End: 2023-11-28 | Stop reason: HOSPADM

## 2023-11-27 RX ORDER — KETOROLAC TROMETHAMINE 15 MG/ML
15 INJECTION, SOLUTION INTRAMUSCULAR; INTRAVENOUS EVERY 6 HOURS PRN
Status: DISCONTINUED | OUTPATIENT
Start: 2023-11-27 | End: 2023-11-28 | Stop reason: HOSPADM

## 2023-11-27 RX ORDER — FENTANYL CITRATE 50 UG/ML
50 INJECTION, SOLUTION INTRAMUSCULAR; INTRAVENOUS
Status: DISCONTINUED | OUTPATIENT
Start: 2023-11-27 | End: 2023-11-27 | Stop reason: HOSPADM

## 2023-11-27 RX ORDER — SODIUM CHLORIDE 9 MG/ML
40 INJECTION, SOLUTION INTRAVENOUS AS NEEDED
Status: DISCONTINUED | OUTPATIENT
Start: 2023-11-27 | End: 2023-11-27 | Stop reason: HOSPADM

## 2023-11-27 RX ORDER — DEXAMETHASONE SODIUM PHOSPHATE 4 MG/ML
INJECTION, SOLUTION INTRA-ARTICULAR; INTRALESIONAL; INTRAMUSCULAR; INTRAVENOUS; SOFT TISSUE AS NEEDED
Status: DISCONTINUED | OUTPATIENT
Start: 2023-11-27 | End: 2023-11-27 | Stop reason: SURG

## 2023-11-27 RX ORDER — SODIUM CHLORIDE, SODIUM LACTATE, POTASSIUM CHLORIDE, CALCIUM CHLORIDE 600; 310; 30; 20 MG/100ML; MG/100ML; MG/100ML; MG/100ML
100 INJECTION, SOLUTION INTRAVENOUS CONTINUOUS
Status: DISCONTINUED | OUTPATIENT
Start: 2023-11-27 | End: 2023-11-28 | Stop reason: HOSPADM

## 2023-11-27 RX ORDER — BISACODYL 10 MG
10 SUPPOSITORY, RECTAL RECTAL DAILY PRN
Status: DISCONTINUED | OUTPATIENT
Start: 2023-11-27 | End: 2023-11-28 | Stop reason: HOSPADM

## 2023-11-27 RX ORDER — KETOROLAC TROMETHAMINE 30 MG/ML
30 INJECTION, SOLUTION INTRAMUSCULAR; INTRAVENOUS EVERY 6 HOURS PRN
Status: DISCONTINUED | OUTPATIENT
Start: 2023-11-27 | End: 2023-11-27

## 2023-11-27 RX ORDER — ACETAMINOPHEN 325 MG/1
650 TABLET ORAL EVERY 4 HOURS PRN
Status: DISCONTINUED | OUTPATIENT
Start: 2023-11-27 | End: 2023-11-28 | Stop reason: HOSPADM

## 2023-11-27 RX ORDER — CEFAZOLIN SODIUM 2 G/100ML
INJECTION, SOLUTION INTRAVENOUS AS NEEDED
Status: DISCONTINUED | OUTPATIENT
Start: 2023-11-27 | End: 2023-11-27 | Stop reason: SURG

## 2023-11-27 RX ORDER — CHOLECALCIFEROL (VITAMIN D3) 125 MCG
5 CAPSULE ORAL NIGHTLY PRN
Status: DISCONTINUED | OUTPATIENT
Start: 2023-11-27 | End: 2023-11-28 | Stop reason: HOSPADM

## 2023-11-27 RX ORDER — HYDROMORPHONE HYDROCHLORIDE 1 MG/ML
0.5 INJECTION, SOLUTION INTRAMUSCULAR; INTRAVENOUS; SUBCUTANEOUS ONCE
Status: COMPLETED | OUTPATIENT
Start: 2023-11-27 | End: 2023-11-27

## 2023-11-27 RX ORDER — FAMOTIDINE 10 MG/ML
20 INJECTION, SOLUTION INTRAVENOUS
Status: COMPLETED | OUTPATIENT
Start: 2023-11-27 | End: 2023-11-27

## 2023-11-27 RX ORDER — HYDROMORPHONE HYDROCHLORIDE 1 MG/ML
0.5 INJECTION, SOLUTION INTRAMUSCULAR; INTRAVENOUS; SUBCUTANEOUS
Status: DISCONTINUED | OUTPATIENT
Start: 2023-11-27 | End: 2023-11-27 | Stop reason: HOSPADM

## 2023-11-27 RX ORDER — SODIUM CHLORIDE 0.9 % (FLUSH) 0.9 %
10 SYRINGE (ML) INJECTION AS NEEDED
Status: DISCONTINUED | OUTPATIENT
Start: 2023-11-27 | End: 2023-11-27 | Stop reason: HOSPADM

## 2023-11-27 RX ADMIN — HYDROMORPHONE HYDROCHLORIDE 0.5 MG: 1 INJECTION, SOLUTION INTRAMUSCULAR; INTRAVENOUS; SUBCUTANEOUS at 00:03

## 2023-11-27 RX ADMIN — DEXAMETHASONE SODIUM PHOSPHATE 4 MG: 4 INJECTION, SOLUTION INTRAMUSCULAR; INTRAVENOUS at 19:34

## 2023-11-27 RX ADMIN — MORPHINE SULFATE 3 MG: 4 INJECTION, SOLUTION INTRAMUSCULAR; INTRAVENOUS at 12:22

## 2023-11-27 RX ADMIN — Medication 10 ML: at 20:52

## 2023-11-27 RX ADMIN — KETOROLAC TROMETHAMINE 30 MG: 30 INJECTION, SOLUTION INTRAMUSCULAR at 05:48

## 2023-11-27 RX ADMIN — SODIUM CHLORIDE, POTASSIUM CHLORIDE, SODIUM LACTATE AND CALCIUM CHLORIDE 100 ML/HR: 600; 310; 30; 20 INJECTION, SOLUTION INTRAVENOUS at 20:52

## 2023-11-27 RX ADMIN — SODIUM CHLORIDE, POTASSIUM CHLORIDE, SODIUM LACTATE AND CALCIUM CHLORIDE 75 ML/HR: 600; 310; 30; 20 INJECTION, SOLUTION INTRAVENOUS at 01:44

## 2023-11-27 RX ADMIN — LIDOCAINE HYDROCHLORIDE 50 MG: 10 INJECTION, SOLUTION EPIDURAL; INFILTRATION; INTRACAUDAL; PERINEURAL at 19:30

## 2023-11-27 RX ADMIN — MORPHINE SULFATE 3 MG: 4 INJECTION, SOLUTION INTRAMUSCULAR; INTRAVENOUS at 06:04

## 2023-11-27 RX ADMIN — SODIUM CHLORIDE, POTASSIUM CHLORIDE, SODIUM LACTATE AND CALCIUM CHLORIDE 100 ML/HR: 600; 310; 30; 20 INJECTION, SOLUTION INTRAVENOUS at 09:30

## 2023-11-27 RX ADMIN — CEFAZOLIN SODIUM 2 G: 2 INJECTION, SOLUTION INTRAVENOUS at 19:30

## 2023-11-27 RX ADMIN — SODIUM CHLORIDE, POTASSIUM CHLORIDE, SODIUM LACTATE AND CALCIUM CHLORIDE 500 ML: 600; 310; 30; 20 INJECTION, SOLUTION INTRAVENOUS at 08:02

## 2023-11-27 RX ADMIN — SODIUM CHLORIDE, POTASSIUM CHLORIDE, SODIUM LACTATE AND CALCIUM CHLORIDE 100 ML/HR: 600; 310; 30; 20 INJECTION, SOLUTION INTRAVENOUS at 14:37

## 2023-11-27 RX ADMIN — SODIUM CHLORIDE, POTASSIUM CHLORIDE, SODIUM LACTATE AND CALCIUM CHLORIDE 9 ML/HR: 600; 310; 30; 20 INJECTION, SOLUTION INTRAVENOUS at 18:58

## 2023-11-27 RX ADMIN — SODIUM CHLORIDE 2000 MG: 900 INJECTION INTRAVENOUS at 21:52

## 2023-11-27 RX ADMIN — TAMSULOSIN HYDROCHLORIDE 0.4 MG: 0.4 CAPSULE ORAL at 01:41

## 2023-11-27 RX ADMIN — FAMOTIDINE 20 MG: 10 INJECTION INTRAVENOUS at 18:58

## 2023-11-27 RX ADMIN — SODIUM CHLORIDE 1000 ML: 9 INJECTION, SOLUTION INTRAVENOUS at 00:04

## 2023-11-27 RX ADMIN — PROPOFOL 200 MG: 10 INJECTION, EMULSION INTRAVENOUS at 19:30

## 2023-11-27 RX ADMIN — METOCLOPRAMIDE 10 MG: 5 INJECTION, SOLUTION INTRAMUSCULAR; INTRAVENOUS at 00:17

## 2023-11-27 RX ADMIN — Medication 10 ML: at 08:04

## 2023-11-27 NOTE — PROGRESS NOTES
[facility] Medicine Services  ADMISSION FOLLOW-UP NOTE          Patient admitted after midnight, H&P by my partner performed earlier on today's date reviewed.  Interim findings, labs, and charting also reviewed.        The [facility] Problem List has been managed and updated to include any new diagnoses:  Active Hospital Problems    Diagnosis  POA    **Obstructive uropathy [N13.9]  Yes    History of breast cancer [Z85.3]  Not Applicable    BYRON (obstructive sleep apnea) [G47.33]  Yes    Anxiety and depression [F41.9, F32.A]  Yes    Lactic acidosis [E87.20]  Yes    Left ureteral stone [N20.1]  Yes    Hypoxia [R09.02]  Yes      Resolved Hospital Problems   No resolved problems to display.     In summary, this is a 47 yo female w h/o remote kidney stone not requiring intervention who presented with left colicky pain. Found to have 7 mm stone w moderate hydronephrosis    ADDITIONAL PLAN:  - awaiting urologic intervention  - lactic acidosis 2/2 profuse vomiting resolved w IVF    Expected Discharge 11/28  Expected Discharge Date: 11/28/2023; Expected Discharge Time:      Susie Penaloza MD  11/27/23

## 2023-11-27 NOTE — CASE MANAGEMENT/SOCIAL WORK
Discharge Planning Assessment  [facility]     Patient Name: Bia Sims  MRN: [id number]  Today's Date: 11/27/2023    Admit Date: 11/26/2023        Discharge Needs Assessment    No documentation.                  Discharge Plan       Row Name 11/27/23 1724       Plan    Plan Comments The patient lives in [place] and has [employer] insurance coverage. Case management is following for discharge needs.    Final Discharge Disposition Code 01 - home or self-care                  Continued Care and Services - Admitted Since 11/26/2023    Coordination has not been started for this encounter.       Expected Discharge Date and Time       Expected Discharge Date Expected Discharge Time    Nov 28, 2023            Demographic Summary       Row Name 11/27/23 1723       General Information    Reason for Consult discharge planning    Preferred Language English       Contact Information    Permission Granted to Share Info With     Contact Information Obtained for                    Functional Status    No documentation.                  Psychosocial    No documentation.                  Abuse/Neglect    No documentation.                  Legal    No documentation.                  Substance Abuse    No documentation.                  Patient Forms    No documentation.                     DELMA [place]

## 2023-11-27 NOTE — CONSULTS
[facility]   Urology Consultation    Patient Name: Bia Sims  : 1975  MRN: [id number]  Primary Care Physician:  Provider, No Known  Date of admission: 2023    Subjective   Subjective     Chief Complaint: LEFT ureteral stone    HPI:    Bia Sims is a 48 y.o. female with a reported history of BYRON, left breast cancer, anxiety/depression and nephrolithiasis.  She presented to the [facility] ED yesterday with approximately 2 days of worsening left flank pain.  Associated with some nausea and scant hematuria.  Patient had presented to this facility's ED on  with similar symptoms.  At that time imaging had revealed a 4 mm left UPJ stone with mild hydronephrosis.  She had been discharged home with Flomax, oxycodone and Zofran as well as a strainer.  She had not followed up with urology as an outpatient.  Pain had improved, but then got much worse a couple of days ago.  Repeat CT yesterday revealed obstructing calculus in the left distal ureter measuring up to 7 mm with moderate proximal hydroureter ureteral nephrosis.  Her lactate was mildly elevated at 2.7.  Normal creatinine and white count.  Urinalysis revealed a large amount of blood with trace ketones and trace protein.  Otherwise unremarkable.  Due to the size of her stone and significant associated pain, she was subsequently admitted to the medicine service.  Urology consulted for further recommendations.    Review of Systems   All systems were reviewed and negative except for: Were noted in the HPI above.    Personal History     Past Medical History:   Diagnosis Date    Breast cancer     Left    Kidney stones     Sleep apnea        Past Surgical History:   Procedure Laterality Date    BREAST SURGERY         Family History: family history is not on file. Otherwise pertinent FHx was reviewed and not pertinent to current issue.    Social History:  reports that she has never smoked. She does not have any smokeless tobacco  history on file. She reports that she does not drink alcohol and does not use drugs.    Home Medications:  ondansetron ODT, sertraline, and tamsulosin      Allergies:  Allergies   Allergen Reactions    Vancomycin Anaphylaxis    Latex Rash       Objective   Objective     Vitals:   Temp:  [98.4 °F (36.9 °C)-99 °F (37.2 °C)] 98.4 °F (36.9 °C)  Heart Rate:  [] 90  Resp:  [10-18] 18  BP: (127-198)/() 127/77  Flow (L/min):  [2] 2  Physical Exam    Constitutional: Awake in bed, alert    Eyes: PERRLA, sclerae anicteric, no conjunctival injection   HENT: Normocephalic, atraumatic, mucous membranes moist   Neck: Supple, trachea midline   Respiratory:Equal chest rise, non-labored respirations    Cardiovascular: RRR, palpable radial pulses bilaterally   Gastrointestinal: Soft, nontender, non-distended, LEFT flank pain to palpation    Musculoskeletal: No bilateral ankle edema, no clubbing or cyanosis to extremities   Genitourinary: Deferred   Psychiatric: Appropriate affect, cooperative   Neurologic: Oriented x 3, Cranial Nerves grossly intact, speech clear   Skin: No rashes     Result Review    Result Review:  I have personally reviewed the results from the time of this admission to 11/27/2023 10:15 EST and agree with these findings:  [x]  Laboratory  [x]  Microbiology  []  Radiology  []  EKG/Telemetry   []  Cardiology/Vascular   []  Pathology  []  Old records  []  Other:        Assessment & Plan   Assessment / Plan     Brief Patient Summary:  Bia Sims is a 48 y.o. female who presents with severe left flank pain and scant gross hematuria in the setting of a LEFT 7 mm distal ureteral stone with moderate proximal hydroureteronephrosis.  Mild lactic acidosis otherwise afebrile with no leukocytosis and normal kidney function.  Blood and urine cultures currently pending.  Given the size of the stone and that this is her second presentation to this facility for stones, we recommend surgical intervention in the form  of cystoscopy with ureteral stone stent placement with possible ureteroscopy and laser lithotripsy.  R/B/A discussed.  Patient agrees to surgical intervention as described above.  We will plan to perform this later today.    Active Hospital Problems:  Active Hospital Problems    Diagnosis     **Obstructive uropathy     History of breast cancer     BYRON (obstructive sleep apnea)     Anxiety and depression     Lactic acidosis     Left ureteral stone     Hypoxia      Plan:   -OR today with Dr. Soto  -Consent  -Keep NPO  -All other plans per primary team  -Will follow  -d/w Dr. Soto.    DVT prophylaxis:  Mechanical DVT prophylaxis orders are present.    CODE STATUS:    Level Of Support Discussed With: Patient  Code Status (Patient has no pulse and is not breathing): CPR (Attempt to Resuscitate)  Medical Interventions (Patient has pulse or is breathing): Full Support    Admission Status:  I believe this patient meets inpt status.    Electronically signed by Brian Medina PA-C, 11/27/23, 10:15 AM EST.

## 2023-11-27 NOTE — THERAPY DISCHARGE NOTE
Patient Name: Bia Sims  : 1975    MRN: [id number]                              Today's Date: 2023       Admit Date: 2023    Visit Dx:     ICD-10-CM ICD-9-CM   1. Left flank pain  R10.9 789.09   2. Nausea and vomiting, unspecified vomiting type  R11.2 787.01   3. Ureterolithiasis  N20.1 592.1   4. Elevated blood pressure reading  R03.0 796.2   5. Impaired functional mobility, balance, gait, and endurance  Z74.09 V49.89     Patient Active Problem List   Diagnosis    Pharyngitis    Exudative tonsillitis    Obstructive uropathy    History of breast cancer    BYRON (obstructive sleep apnea)    Anxiety and depression    Lactic acidosis    Left ureteral stone    Hypoxia     Past Medical History:   Diagnosis Date    Breast cancer     Left    Kidney stones     Sleep apnea      Past Surgical History:   Procedure Laterality Date    BREAST SURGERY        General Information       Row Name 23 1501          Physical Therapy Time and Intention    Document Type discharge evaluation/summary  -SD     Mode of Treatment individual therapy;physical therapy  -SD       Row Name 23 1501          General Information    Patient Profile Reviewed yes  -SD     Prior Level of Function independent:;all household mobility;community mobility;gait;transfer;bed mobility;ADL's;work;using stairs  pt is a  and works at [employer]  -SD     Existing Precautions/Restrictions no known precautions/restrictions;NPO  -SD     Barriers to Rehab none identified  -SD       Row Name 23 1501          Living Environment    People in Home child(marlene), adult  -SD       Row Name 23 1501          Cognition    Orientation Status (Cognition) oriented x 4  -SD       Row Name 23 1501          Safety Issues, Functional Mobility    Comment, Safety Issues/Impairments (Mobility) No impairments or safety issues with mobility  -SD               User Key  (r) = Recorded By, (t) = Taken By, (c) = Cosigned By       Initials Name Provider Type    SD Khushi Esteban, PT Physical Therapist                   Mobility       Row Name 11/27/23 1624          Bed Mobility    Bed Mobility supine-sit  -SD     Supine-Sit [place] (Bed Mobility) independent  -SD     Comment, (Bed Mobility) pt able to perform without difficulty  -SD       Row Name 11/27/23 1624          Transfers    Comment, (Transfers) pt able to perform without difficulty  -SD       Row Name 11/27/23 1624          Sit-Stand Transfer    Sit-Stand [place] (Transfers) independent  -SD       Row Name 11/27/23 1624          Gait/Stairs (Locomotion)    [place] Level (Gait) independent  -SD     Distance in Feet (Gait) 900ft  -SD     Comment, (Gait/Stairs) Pt amb in hallway without assistive device with independence. Pt dem normal gait pattern and had no LOB.  -SD               User Key  (r) = Recorded By, (t) = Taken By, (c) = Cosigned By      Initials Name Provider Type    Khushi Clark, PT Physical Therapist                   Obj/Interventions       Row Name 11/27/23 1626          Range of Motion Comprehensive    General Range of Motion bilateral lower extremity ROM WNL  -SD       Row Name 11/27/23 1626          Strength Comprehensive (MMT)    General Manual Muscle Testing (MMT) Assessment no strength deficits identified  -SD       Row Name 11/27/23 1626          Balance    Balance Assessment sitting static balance;sitting dynamic balance;standing static balance;standing dynamic balance  -SD     Static Sitting Balance independent  -SD     Dynamic Sitting Balance independent  -SD     Position, Sitting Balance unsupported;sitting edge of bed  -SD     Static Standing Balance independent  -SD     Dynamic Standing Balance independent  -SD     Position/Device Used, Standing Balance unsupported  -SD     Balance Interventions occupation based/functional task;standing;dynamic;static;sitting  -SD     Comment, Balance ADL's in bathroom, LBD  -SD                User Key  (r) = Recorded By, (t) = Taken By, (c) = Cosigned By      Initials Name Provider Type    Khushi Clark, PT Physical Therapist                   Goals/Plan    No documentation.                  Clinical Impression       Row Name 11/27/23 1627          Pain    Pain Location - Side/Orientation Left  -SD     Pain Location - flank  -SD     Additional Documentation Pain Scale: FACES Pre/Post-Treatment (Group)  -SD       Row Name 11/27/23 1627          Pain Scale: FACES Pre/Post-Treatment    Pain: FACES Scale, Pretreatment 4-->hurts little more  -SD     Posttreatment Pain Rating 4-->hurts little more  -SD     Pre/Posttreatment Pain Comment premedicated  -SD       Row Name 11/27/23 1627          Plan of Care Review    Plan of Care Reviewed With patient  -SD     Outcome Evaluation Pt presents at baseline level of function and is independent with all mobility tasks. No further IPPT services warranted at this time.  -SD       Row Name 11/27/23 1627          Therapy Assessment/Plan (PT)    Patient/Family Therapy Goals Statement (PT) have procedure and return home  -SD     Criteria for Skilled Interventions Met (PT) yes  -SD     Therapy Frequency (PT) evaluation only  -SD       Row Name 11/27/23 1627          Vital Signs    Pre Systolic BP Rehab 115  -SD     Pre Treatment Diastolic BP 61  -SD     Pretreatment Heart Rate (beats/min) 88  -SD     Pre SpO2 (%) 95  -SD     O2 Delivery Pre Treatment room air  -SD     Pre Patient Position Supine  -SD     Post Patient Position Supine  -SD       Row Name 11/27/23 1627          Positioning and Restraints    Pre-Treatment Position in bed  -SD     Post Treatment Position bed  -SD     In Bed notified nsg;supine;call light within reach;encouraged to call for assist;with nsg  -SD               User Key  (r) = Recorded By, (t) = Taken By, (c) = Cosigned By      Initials Name Provider Type    Khushi Clark, PT Physical Therapist                   Outcome Measures        Row Name 11/27/23 1633 11/27/23 0800       How much help from another person do you currently need...    Turning from your back to your side while in flat bed without using bedrails? 4  -SD 3  -EH    Moving from lying on back to sitting on the side of a flat bed without bedrails? 4  -SD 3  -EH    Moving to and from a bed to a chair (including a wheelchair)? 4  -SD 3  -EH    Standing up from a chair using your arms (e.g., wheelchair, bedside chair)? 4  -SD 3  -EH    Climbing 3-5 steps with a railing? 4  -SD 3  -EH    To walk in hospital room? 4  -SD 3  -EH    AM-PAC 6 Clicks Score (PT) 24  -SD 18  -EH    Highest Level of Mobility Goal 8 --> Walked 250 feet or more  -SD 6 --> Walk 10 steps or more  -EH      Row Name 11/27/23 1633          Functional Assessment    Outcome Measure Options AM-PAC 6 Clicks Basic Mobility (PT)  -SD               User Key  (r) = Recorded By, (t) = Taken By, (c) = Cosigned By      Initials Name Provider Type    SD Khushi Esteban, ANJALI Physical Therapist    Judith Nugent RN Registered Nurse                  Physical Therapy Education       Title: PT OT SLP Therapies (Done)       Topic: Physical Therapy (Done)       Point: Mobility training (Done)       Learning Progress Summary             Patient Acceptance, E, VU,DU by SD at 11/27/2023 1633                         Point: Home exercise program (Done)       Learning Progress Summary             Patient Acceptance, E, VU,DU by SD at 11/27/2023 1633                         Point: Body mechanics (Done)       Learning Progress Summary             Patient Acceptance, E, VU,DU by SD at 11/27/2023 1633                         Point: Precautions (Done)       Learning Progress Summary             Patient Acceptance, E, VU,DU by SD at 11/27/2023 1633                                         User Key       Initials Effective Dates Name Provider Type Discipline    SD 03/13/23 -  Khushi Esteban, PT Physical Therapist PT                  PT  Recommendation and Plan     Plan of Care Reviewed With: patient  Outcome Evaluation: Pt presents at baseline level of function and is independent with all mobility tasks. No further IPPT services warranted at this time.     Time Calculation:   PT Evaluation Complexity  History, PT Evaluation Complexity: 1-2 personal factors and/or comorbidities  Examination of Body Systems (PT Eval Complexity): total of 3 or more elements  Clinical Presentation (PT Evaluation Complexity): evolving  Clinical Decision Making (PT Evaluation Complexity): low complexity  Overall Complexity (PT Evaluation Complexity): low complexity     PT Charges       Row Name 11/27/23 1634             Time Calculation    Start Time 1501  chart review [id number]  -SD      PT Non-Billable Time (min) 49 min  -SD      PT Received On 11/27/23  -SD                User Key  (r) = Recorded By, (t) = Taken By, (c) = Cosigned By      Initials Name Provider Type    Khushi Clark, ANJALI Physical Therapist                  Therapy Charges for Today       Code Description Service Date Service Provider Modifiers Qty    26791049138 HC PT EVAL LOW COMPLEXITY 4 11/27/2023 Khushi Esteban, ANJALI GP 1            PT G-Codes  Outcome Measure Options: AM-PAC 6 Clicks Basic Mobility (PT)  AM-PAC 6 Clicks Score (PT): 24    PT Discharge Summary  Anticipated Discharge Disposition (PT): home with assist    Khushi Esteban PT  11/27/2023

## 2023-11-27 NOTE — H&P
[facility] Medicine Services  HISTORY AND PHYSICAL    Patient Name: Bia Sims  : 1975  MRN: [id number]  Primary Care Physician: Provider, No Known  Date of admission: 2023    Subjective   Subjective     Chief Complaint:  Left flank pain    HPI:  Bia Sims is a 48 y.o. female with BYRON, left breast cancer, anxiety/depression and kidney stones. She presents today with complaints of severe left flank pain going on for the past 2 days. Pain is rated 10/10 in severity, waxing and waning without modifying factors. There is associated subjective fever, intractable nausea with non bloody vomiting, and hematuria. Patient was suspected a kidney stone. She was seen in ED 10/23/23 with similar symptoms. Imaging at that time demonstrated Mild left hydronephrosis secondary to a 4 mm left UPJ stone. Patient was discharged home on Flomax, Oxycodone, and Zofran. Has not followed up with urology in the interim. She returns today with worsened symptoms.  Currently there are no complaints of cough, congestion, SOB, or chest pain. No abdominal pain, diarrhea, constipation. No dysuria, urinary frequency, or urgency. Will admit to observation.      Review of Systems   Constitutional:  Positive for fever. Negative for chills and fatigue.   HENT:  Negative for congestion and trouble swallowing.    Eyes:  Negative for photophobia and visual disturbance.   Respiratory:  Negative for cough and shortness of breath.    Cardiovascular:  Negative for chest pain and leg swelling.   Gastrointestinal:  Positive for nausea and vomiting. Negative for abdominal pain and diarrhea.   Endocrine: Negative for cold intolerance and heat intolerance.   Genitourinary:  Positive for flank pain and hematuria. Negative for dysuria.   Musculoskeletal:  Negative for gait problem and myalgias.   Skin:  Negative for pallor and rash.   Allergic/Immunologic: Negative for immunocompromised state.   Neurological:  Negative  for dizziness and headaches.   Hematological:  Negative for adenopathy.   Psychiatric/Behavioral:  Negative for agitation and confusion.           Personal History     Past Medical History:   Diagnosis Date    Breast cancer     Left    Kidney stones     Sleep apnea              Past Surgical History:   Procedure Laterality Date    BREAST SURGERY         Family History: family history is not on file.     Social History:  reports that she has never smoked. She does not have any smokeless tobacco history on file. She reports that she does not drink alcohol and does not use drugs.  Social History     Social History Narrative    Not on file       Medications:  ondansetron ODT, sertraline, and tamsulosin    Allergies   Allergen Reactions    Vancomycin Anaphylaxis    Latex Rash       Objective   Objective     Vital Signs:   Temp:  [99 °F (37.2 °C)] 99 °F (37.2 °C)  Heart Rate:  [] 90  Resp:  [10-18] 12  BP: (141-198)/() 141/75    Physical Exam   Constitutional: Awake, alert  Eyes: PERRLA, sclerae anicteric, no conjunctival injection  HENT: NCAT, mucous membranes moist  Neck: Supple, no thyromegaly, no lymphadenopathy, trachea midline  Respiratory: Clear to auscultation bilaterally, nonlabored respirations   Cardiovascular: RRR, no murmurs, rubs, or gallops, palpable pedal pulses bilaterally  Gastrointestinal: Positive bowel sounds, soft, nontender, nondistended  Musculoskeletal: No bilateral ankle edema, no clubbing or cyanosis to extremities  Left CVA tenderness  Psychiatric: Appropriate affect, cooperative  Neurologic: Oriented x 3, strength symmetric in all extremities, Cranial Nerves grossly intact to confrontation, speech clear  Skin: No rashes     Result Review:  I have personally reviewed the results from the time of this admission to 11/27/2023 02:41 EST and agree with these findings:  [x]  Laboratory list / accordion  []  Microbiology  []  Radiology  []  EKG/Telemetry   []  Cardiology/Vascular   []   Pathology  [x]  Old records  []  Other:  Most notable findings include: borderline febrile at 99. 91% on RA. CT A/P demonstrates left ureter measuring up to 7 mm     LAB RESULTS:      Lab 11/26/23  2347 11/26/23 2207   WBC  --  12.14*   HEMOGLOBIN  --  16.0*   HEMATOCRIT  --  43.5   PLATELETS  --  242   NEUTROS ABS  --  9.84*   IMMATURE GRANS (ABS)  --  0.04   LYMPHS ABS  --  1.55   MONOS ABS  --  0.64   EOS ABS  --  0.03   MCV  --  88.6   LACTATE 2.4* 3.7*         Lab 11/26/23 2207   SODIUM 140   POTASSIUM 3.7   CHLORIDE 103   CO2 21.0*   ANION GAP 16.0*   BUN 12   CREATININE 0.89   EGFR 79.1   GLUCOSE 129*   CALCIUM 9.9         Lab 11/26/23 2207   TOTAL PROTEIN 7.7   ALBUMIN 4.5   GLOBULIN 3.2   ALT (SGPT) 21   AST (SGOT) 23   BILIRUBIN 0.4   ALK PHOS 83   LIPASE 24                     Brief Urine Lab Results  (Last result in the past 365 days)        Color   Clarity   Blood   Leuk Est   Nitrite   Protein   CREAT   Urine HCG        11/26/23 2357 Yellow   Clear   Large (3+)   Negative   Negative   Trace                 Microbiology Results (last 10 days)       ** No results found for the last 240 hours. **            CT Abdomen Pelvis Without Contrast    Result Date: 11/26/2023  CT ABDOMEN PELVIS WO CONTRAST Date of Exam: 11/26/2023 10:20 PM EST Indication: Flank pain, kidney stone suspected Nausea/vomiting had 4 mm proximal left UPJ stone 10/23/23, recurrent pain/vomiting. Comparison: 10/23/2023. Technique: Axial CT images were obtained of the abdomen and pelvis without the administration of contrast. Reconstructed coronal and sagittal images were also obtained. Automated exposure control and iterative construction methods were used. Findings: Lung Bases:   The visualized lung bases and lower mediastinal structures are unremarkable. Limited evaluation of the solid organs due to lack of intravenous contrast. Liver: Liver is normal in size and CT density. Simple cyst present within the left hepatic lobe..  Biliary/Gallbladder:  The gallbladder is normal without evidence of radiopaque stones. The biliary tree is nondilated. Spleen: Spleen is normal in size and CT density. Pancreas:  Pancreas is normal. There is no evidence of pancreatic mass or peripancreatic fluid. Kidneys:  Kidneys are normal in size. There is an obstructing calculus present within the distal left ureter measuring up to 7 mm in craniocaudal dimension (series 2 image 125). There is moderate proximal hydroureteronephrosis. Mild stranding is seen surrounding the left kidney likely related to increased back pressure. No additional stones identified.. Adrenals:  Adrenal glands are unremarkable. Retroperitoneal/Lymph Nodes/Vasculature:  No retroperitoneal adenopathy is identified. Gastrointestinal/Mesentery:  The bowel loops are non-dilated without wall thickening or mass. The appendix appears within normal limits. No evidence of obstruction. No free air. No mesenteric fluid collections identified. Bladder:  The bladder is normal. Genital:   Unremarkable       Bony Structures:   Visualized bony structures are consistent with the patient's age.     Impression: Impression: 1.Obstructing calculus present within the distal left ureter measuring up to 7 mm with moderate proximal hydroureteronephrosis. No additional stones identified. 2.Ancillary findings as described above. Electronically Signed: Hermelinda Kebede MD  11/26/2023 10:35 PM EST  Workstation ID: [id number]         Assessment & Plan   Assessment & Plan       Obstructive uropathy    Lactic acidosis    Left ureteral stone    Hypoxia    History of breast cancer    BYRON (obstructive sleep apnea)    Anxiety and depression    87 yo female here with left obstructive stone. To see urology.    Obstructive Uropathy  - failed outpatient treatment  - CT imaging notes; Obstructing calculus present within the distal left ureter measuring up to 7 mm with moderate proximal hydroureteronephrosis   - consult to urology  -  NPO after midnight  - borderline febrile with lactic acidosis. Obtain UA with urine and blood cultures  - IV hydration  - start Flomax  - pre-op EKG  - pain and nausea control  - am labs    Hypoxia  - 89% on RA 2/2 opioids  - stable on 2L NC  - also suspected BYRON  - obtain CXR    Anxiety/depression  - continue Zoloft    DVT prophylaxis:  mechanical    CODE STATUS:  full code  Level Of Support Discussed With: Patient  Code Status (Patient has no pulse and is not breathing): CPR (Attempt to Resuscitate)  Medical Interventions (Patient has pulse or is breathing): Full Support      Expected Discharge  TBD      Signature: Electronically signed by Delmi Daugherty PA-C, 11/27/23, 1:29 AM EST    Total time spent: 90 minutes  Time spent includes time reviewing chart, face-to-face time, counseling patient/family/caregiver, ordering medications/tests/procedures, communicating with other health care professionals, documenting clinical information in the electronic health record, and coordination of care.    --------------------    The patient was seen independently by the APC.  I was available for any questions or concerns.     Electronically signed by Guanako Zheng III, DO, 11/27/23, 6:35 AM EST.

## 2023-11-27 NOTE — ED PROVIDER NOTES
Subjective   History of Present Illness  48-year-old female presents to the emergency department accompanied by her spouse with concerns about severe 10 out of 10 left flank pain since approximately 1600 on 11/26/2023 with associated nausea and vomiting.  The patient tried to take Zofran and ODT as well as oxycodone, but vomited and has persistent severe pain.  The patient was seen in our emergency department on 10/23/2023 for similar symptoms, and had a CT which showed a 4 mm left UPJ stone.  Patient was instructed to follow-up with Dr. Cerda of urology, but did not follow-up.  She was given a urine strainer and has been straining her urine, but states that she has not passed the stone.  She denies recent fever.      Review of Systems   Constitutional:  Negative for diaphoresis and fever.   Eyes:  Negative for photophobia and discharge.   Respiratory:  Negative for stridor.    Gastrointestinal:  Positive for nausea and vomiting.   Genitourinary:  Positive for flank pain.        Postmenopausal   Neurological:  Negative for facial asymmetry.       Past Medical History:   Diagnosis Date    Breast cancer     Left    Kidney stones     Sleep apnea      Kidney stone    Allergies   Allergen Reactions    Vancomycin Anaphylaxis    Latex Rash       Past Surgical History:   Procedure Laterality Date    BREAST SURGERY         History reviewed. No pertinent family history.    Social History     Socioeconomic History    Marital status:    Tobacco Use    Smoking status: Never   Substance and Sexual Activity    Alcohol use: No    Drug use: Defer    Sexual activity: Defer           Objective   Physical Exam  Vitals and nursing note reviewed.   Constitutional:       Appearance: She is not diaphoretic.      Comments: BMI 43, appears uncomfortable   Eyes:      General: No scleral icterus.  Pulmonary:      Effort: Pulmonary effort is normal.      Breath sounds: No stridor.   Abdominal:      General: There is no distension.       Palpations: Abdomen is soft.      Tenderness: There is no abdominal tenderness. There is no guarding.   Skin:     General: Skin is warm and dry.      Coloration: Skin is pale. Skin is not jaundiced.   Neurological:      Mental Status: She is alert.      Comments: No dysarthria, no facial droop.  Moves all extremities.   Psychiatric:      Comments: Anxious affect         Procedures           ED Course  ED Course as of 11/27/23 0127   Mon Nov 27, 2023   0126 Pain control is currently adequate.  Results and plan discussed with patient and her daughter at the bedside.  Patient required repeat dosing of parenteral opiate therapy for pain control, and had significant elevated blood pressure with her pain.  She had vomiting with attempt to use oral analgesics prior to arrival despite taking Zofran ODT.  It would appear that this kidney stone has been in the ureter for approximately 1 month, and she is agreeable to further treatment in the hospital including a further hydration, repeat lactic acid level which was elevated but trending down, and urology consult in the hospital. [LD]      ED Course User Index  [LD] Cecilia Levine MD                                           Medical Decision Making  Differential diagnosis includes ureterolithiasis, acute kidney injury, dehydration, urinary tract infection, electrolyte abnormality, and others.    Problems Addressed:  Elevated blood pressure reading: complicated acute illness or injury  Left flank pain: complicated acute illness or injury  Nausea and vomiting, unspecified vomiting type: complicated acute illness or injury  Ureterolithiasis: complicated acute illness or injury    Amount and/or Complexity of Data Reviewed  Independent Historian: spouse     Details: And daughter at bedside  External Data Reviewed: radiology and notes.     Details: 10/23/23 CT report reviewed and ED note reviewed.  Labs: ordered. Decision-making details documented in ED Course.  Radiology:  ordered. Decision-making details documented in ED Course.  Discussion of management or test interpretation with external provider(s): Hospitalist Dr. Zheng contacted by [facility] chat regarding admission at approximately 0126.    Risk  Prescription drug management.  Decision regarding hospitalization.      Recent Results (from the past 24 hour(s))   Comprehensive Metabolic Panel    Collection Time: 11/26/23 10:07 PM    Specimen: Blood   Result Value Ref Range    Glucose 129 (H) 65 - 99 mg/dL    BUN 12 6 - 20 mg/dL    Creatinine 0.89 0.57 - 1.00 mg/dL    Sodium 140 136 - 145 mmol/L    Potassium 3.7 3.5 - 5.2 mmol/L    Chloride 103 98 - 107 mmol/L    CO2 21.0 (L) 22.0 - 29.0 mmol/L    Calcium 9.9 8.6 - 10.5 mg/dL    Total Protein 7.7 6.0 - 8.5 g/dL    Albumin 4.5 3.5 - 5.2 g/dL    ALT (SGPT) 21 1 - 33 U/L    AST (SGOT) 23 1 - 32 U/L    Alkaline Phosphatase 83 39 - 117 U/L    Total Bilirubin 0.4 0.0 - 1.2 mg/dL    Globulin 3.2 gm/dL    A/G Ratio 1.4 g/dL    BUN/Creatinine Ratio 13.5 7.0 - 25.0    Anion Gap 16.0 (H) 5.0 - 15.0 mmol/L    eGFR 79.1 >60.0 mL/min/1.73   Lipase    Collection Time: 11/26/23 10:07 PM    Specimen: Blood   Result Value Ref Range    Lipase 24 13 - 60 U/L   Lactic Acid, Plasma    Collection Time: 11/26/23 10:07 PM    Specimen: Blood   Result Value Ref Range    Lactate 3.7 (C) 0.5 - 2.0 mmol/L   Green Top (Gel)    Collection Time: 11/26/23 10:07 PM   Result Value Ref Range    Extra Tube Hold for add-ons.    Lavender Top    Collection Time: 11/26/23 10:07 PM   Result Value Ref Range    Extra Tube hold for add-on    Gold Top - SST    Collection Time: 11/26/23 10:07 PM   Result Value Ref Range    Extra Tube Hold for add-ons.    Light Blue Top    Collection Time: 11/26/23 10:07 PM   Result Value Ref Range    Extra Tube Hold for add-ons.    CBC Auto Differential    Collection Time: 11/26/23 10:07 PM    Specimen: Blood   Result Value Ref Range    WBC 12.14 (H) 3.40 - 10.80 10*3/mm3    RBC 4.91 3.77 - 5.28  10*6/mm3    Hemoglobin 16.0 (H) 12.0 - 15.9 g/dL    Hematocrit 43.5 34.0 - 46.6 %    MCV 88.6 79.0 - 97.0 fL    MCH 32.6 26.6 - 33.0 pg    MCHC 36.8 (H) 31.5 - 35.7 g/dL    RDW 13.0 12.3 - 15.4 %    RDW-SD 42.0 37.0 - 54.0 fl    MPV 9.9 6.0 - 12.0 fL    Platelets 242 140 - 450 10*3/mm3    Neutrophil % 81.1 (H) 42.7 - 76.0 %    Lymphocyte % 12.8 (L) 19.6 - 45.3 %    Monocyte % 5.3 5.0 - 12.0 %    Eosinophil % 0.2 (L) 0.3 - 6.2 %    Basophil % 0.3 0.0 - 1.5 %    Immature Grans % 0.3 0.0 - 0.5 %    Neutrophils, Absolute 9.84 (H) 1.70 - 7.00 10*3/mm3    Lymphocytes, Absolute 1.55 0.70 - 3.10 10*3/mm3    Monocytes, Absolute 0.64 0.10 - 0.90 10*3/mm3    Eosinophils, Absolute 0.03 0.00 - 0.40 10*3/mm3    Basophils, Absolute 0.04 0.00 - 0.20 10*3/mm3    Immature Grans, Absolute 0.04 0.00 - 0.05 10*3/mm3    nRBC 0.0 0.0 - 0.2 /100 WBC   STAT Lactic Acid, Reflex    Collection Time: 11/26/23 11:47 PM    Specimen: Blood   Result Value Ref Range    Lactate 2.4 (C) 0.5 - 2.0 mmol/L   Urinalysis With Microscopic If Indicated (No Culture) - Urine, Clean Catch    Collection Time: 11/26/23 11:57 PM    Specimen: Urine, Clean Catch   Result Value Ref Range    Color, UA Yellow Yellow, Straw    Appearance, UA Clear Clear    pH, UA <=5.0 5.0 - 8.0    Specific Gravity, UA 1.022 1.001 - 1.030    Glucose, UA Negative Negative    Ketones, UA Trace (A) Negative    Bilirubin, UA Negative Negative    Blood, UA Large (3+) (A) Negative    Protein, UA Trace (A) Negative    Leuk Esterase, UA Negative Negative    Nitrite, UA Negative Negative    Urobilinogen, UA 0.2 E.U./dL 0.2 - 1.0 E.U./dL   Urinalysis, Microscopic Only - Urine, Clean Catch    Collection Time: 11/26/23 11:57 PM    Specimen: Urine, Clean Catch   Result Value Ref Range    RBC, UA 6-10 (A) None Seen, 0-2 /HPF    WBC, UA 3-5 (A) None Seen, 0-2 /HPF    Bacteria, UA None Seen None Seen, Trace /HPF    Squamous Epithelial Cells, UA 0-2 None Seen, 0-2 /HPF    Hyaline Casts, UA 0-6 0 - 6  /LPF    Calcium Oxalate Crystals, UA Large/3+ None Seen /HPF    Methodology Manual Light Microscopy      Note: In addition to lab results from this visit, the labs listed above may include labs taken at another facility or during a different encounter within the last 24 hours. Please correlate lab times with ED admission and discharge times for further clarification of the services performed during this visit.    CT Abdomen Pelvis Without Contrast   Final Result   Impression:   1.Obstructing calculus present within the distal left ureter measuring up to 7 mm with moderate proximal hydroureteronephrosis. No additional stones identified.   2.Ancillary findings as described above.            Electronically Signed: Hermelinda Kebede MD     11/26/2023 10:35 PM EST     Workstation ID: [id number]        Vitals:    11/26/23 2300 11/26/23 2330 11/27/23 0000 11/27/23 0030   BP: 152/76 146/73 (!) 198/132 160/89   BP Location:       Patient Position:       Pulse: 93 96 109 98   Resp: 10  18 12   Temp:       TempSrc:       SpO2: 94% 94% 93% 91%   Weight:       Height:         Medications   Sodium Chloride (PF) 0.9 % 10 mL (has no administration in time range)   lactated ringers infusion (has no administration in time range)   ketorolac (TORADOL) injection 30 mg (30 mg Intravenous Given 11/26/23 2218)   sodium chloride 0.9 % bolus 1,000 mL (0 mL Intravenous Stopped 11/26/23 2358)   ondansetron (ZOFRAN) injection 4 mg (4 mg Intravenous Given 11/26/23 2218)   Morphine sulfate (PF) injection 4 mg (4 mg Intravenous Given 11/26/23 2218)   sodium chloride 0.9 % bolus 1,000 mL (0 mL Intravenous Stopped 11/27/23 0123)   HYDROmorphone (DILAUDID) injection 0.5 mg (0.5 mg Intravenous Given 11/27/23 0003)   metoclopramide (REGLAN) injection 10 mg (10 mg Intravenous Given 11/27/23 0017)     ECG/EMG Results (last 24 hours)       ** No results found for the last 24 hours. **          No orders to display           Final diagnoses:   Left flank pain    Nausea and vomiting, unspecified vomiting type   Ureterolithiasis   Elevated blood pressure reading       ED Disposition  ED Disposition       ED Disposition   Decision to Admit    Condition   --    Comment   --               No follow-up provider specified.       Medication List      No changes were made to your prescriptions during this visit.            Cecilia Levine MD  11/27/23 0128

## 2023-11-27 NOTE — PLAN OF CARE
Goal Outcome Evaluation:  Plan of Care Reviewed With: patient           Outcome Evaluation: Pt presents at baseline level of function and is independent with all mobility tasks. No further IPPT services warranted at this time.      Anticipated Discharge Disposition (PT): home with assist

## 2023-11-28 VITALS
WEIGHT: 250.1 LBS | BODY MASS INDEX: 46.02 KG/M2 | OXYGEN SATURATION: 93 % | HEART RATE: 74 BPM | DIASTOLIC BLOOD PRESSURE: 72 MMHG | SYSTOLIC BLOOD PRESSURE: 116 MMHG | TEMPERATURE: 97.6 F | HEIGHT: 62 IN | RESPIRATION RATE: 18 BRPM

## 2023-11-28 LAB
ANION GAP SERPL CALCULATED.3IONS-SCNC: 10 MMOL/L (ref 5–15)
BACTERIA SPEC AEROBE CULT: NO GROWTH
BUN SERPL-MCNC: 9 MG/DL (ref 6–20)
BUN/CREAT SERPL: 16.1 (ref 7–25)
CALCIUM SPEC-SCNC: 9.1 MG/DL (ref 8.6–10.5)
CHLORIDE SERPL-SCNC: 105 MMOL/L (ref 98–107)
CO2 SERPL-SCNC: 26 MMOL/L (ref 22–29)
CREAT SERPL-MCNC: 0.56 MG/DL (ref 0.57–1)
EGFRCR SERPLBLD CKD-EPI 2021: 111.3 ML/MIN/1.73
GLUCOSE SERPL-MCNC: 115 MG/DL (ref 65–99)
POTASSIUM SERPL-SCNC: 3.9 MMOL/L (ref 3.5–5.2)
QT INTERVAL: 382 MS
QTC INTERVAL: 464 MS
SODIUM SERPL-SCNC: 141 MMOL/L (ref 136–145)

## 2023-11-28 PROCEDURE — G0378 HOSPITAL OBSERVATION PER HR: HCPCS

## 2023-11-28 PROCEDURE — 99232 SBSQ HOSP IP/OBS MODERATE 35: CPT | Performed by: UROLOGY

## 2023-11-28 PROCEDURE — 25810000003 LACTATED RINGERS PER 1000 ML: Performed by: UROLOGY

## 2023-11-28 PROCEDURE — 80048 BASIC METABOLIC PNL TOTAL CA: CPT | Performed by: UROLOGY

## 2023-11-28 PROCEDURE — 99238 HOSP IP/OBS DSCHRG MGMT 30/<: CPT | Performed by: INTERNAL MEDICINE

## 2023-11-28 PROCEDURE — 25010000002 MORPHINE PER 10 MG: Performed by: UROLOGY

## 2023-11-28 PROCEDURE — 25010000002 CEFAZOLIN PER 500 MG: Performed by: UROLOGY

## 2023-11-28 RX ORDER — HYDROCODONE BITARTRATE AND ACETAMINOPHEN 5; 325 MG/1; MG/1
1 TABLET ORAL EVERY 6 HOURS PRN
Status: DISCONTINUED | OUTPATIENT
Start: 2023-11-28 | End: 2023-11-28 | Stop reason: HOSPADM

## 2023-11-28 RX ORDER — CEFDINIR 300 MG/1
300 CAPSULE ORAL 2 TIMES DAILY
Qty: 10 CAPSULE | Refills: 0 | Status: SHIPPED | OUTPATIENT
Start: 2023-11-28 | End: 2023-11-28 | Stop reason: SDUPTHER

## 2023-11-28 RX ORDER — HYDROCODONE BITARTRATE AND ACETAMINOPHEN 5; 325 MG/1; MG/1
1 TABLET ORAL EVERY 6 HOURS PRN
Qty: 8 TABLET | Refills: 0 | Status: SHIPPED | OUTPATIENT
Start: 2023-11-28 | End: 2023-11-30

## 2023-11-28 RX ORDER — PHENAZOPYRIDINE HYDROCHLORIDE 200 MG/1
200 TABLET, FILM COATED ORAL 3 TIMES DAILY
Qty: 6 TABLET | Refills: 0 | Status: SHIPPED | OUTPATIENT
Start: 2023-11-28 | End: 2023-12-01 | Stop reason: SDUPTHER

## 2023-11-28 RX ORDER — ONDANSETRON 4 MG/1
4 TABLET, ORALLY DISINTEGRATING ORAL EVERY 6 HOURS PRN
Qty: 20 TABLET | Refills: 0 | Status: SHIPPED | OUTPATIENT
Start: 2023-11-28

## 2023-11-28 RX ORDER — PHENAZOPYRIDINE HYDROCHLORIDE 200 MG/1
200 TABLET, FILM COATED ORAL EVERY 8 HOURS PRN
Qty: 6 TABLET | Refills: 0 | Status: SHIPPED | OUTPATIENT
Start: 2023-11-28 | End: 2023-11-28 | Stop reason: SDUPTHER

## 2023-11-28 RX ORDER — CEFDINIR 300 MG/1
300 CAPSULE ORAL 2 TIMES DAILY
Qty: 14 CAPSULE | Refills: 0 | Status: SHIPPED | OUTPATIENT
Start: 2023-11-28 | End: 2023-12-05

## 2023-11-28 RX ADMIN — TAMSULOSIN HYDROCHLORIDE 0.4 MG: 0.4 CAPSULE ORAL at 08:34

## 2023-11-28 RX ADMIN — MORPHINE SULFATE 3 MG: 4 INJECTION, SOLUTION INTRAMUSCULAR; INTRAVENOUS at 00:06

## 2023-11-28 RX ADMIN — OXYBUTYNIN CHLORIDE 5 MG: 5 TABLET ORAL at 02:07

## 2023-11-28 RX ADMIN — SODIUM CHLORIDE 2000 MG: 900 INJECTION INTRAVENOUS at 06:13

## 2023-11-28 RX ADMIN — SODIUM CHLORIDE, POTASSIUM CHLORIDE, SODIUM LACTATE AND CALCIUM CHLORIDE 100 ML/HR: 600; 310; 30; 20 INJECTION, SOLUTION INTRAVENOUS at 02:04

## 2023-11-28 NOTE — PROGRESS NOTES
S:  NAEO.  Significant improvement in pain.  Minimal soreness.  Voiding well with pink-tinged urine.  Clearing.  No dysuria.  Tolerating diet.      O:  Vitals:    11/28/23 0710   BP: 116/72   Pulse: 74   Resp: 18   Temp: 97.6 °F (36.4 °C)   SpO2: 93%      Sitting up in bed, NAD  A&O x 4  Speaking in full sentences.  No increased WOB  MAEW    Cr: 0.56    A/P:  #POD 1 Cystoscopy with ureteral stent insertion and ureteroscopy with laser lithotripsy for distal ureteral stone  -doing well  -can dc from urology standpoint  -post op care discussed  -f/u in 5 days in clinic for stent removal

## 2023-11-28 NOTE — ANESTHESIA PREPROCEDURE EVALUATION
Anesthesia Evaluation     Patient summary reviewed, Nursing notes reviewed and pregnancy test completed   no history of anesthetic complications:   NPO Solid Status: > 8 hours  NPO Liquid Status: > 2 hours           Airway   Mallampati: II  TM distance: >3 FB  Neck ROM: full  No difficulty expected  Dental - normal exam     Pulmonary - normal exam    breath sounds clear to auscultation  (+) ,sleep apnea (Possible)  Cardiovascular - normal exam    ECG reviewed  Rhythm: regular  Rate: normal        Neuro/Psych- negative ROS  GI/Hepatic/Renal/Endo    (+) morbid obesity, renal disease- stones    Musculoskeletal     Abdominal    Substance History      OB/GYN          Other      history of cancer (Breast ca s/p mastectomy + xrt/chemo)                Anesthesia Plan    ASA 3     general     intravenous induction     Anesthetic plan, risks, benefits, and alternatives have been provided, discussed and informed consent has been obtained with: patient.    Plan discussed with CRNA.    CODE STATUS:    Level Of Support Discussed With: Patient  Code Status (Patient has no pulse and is not breathing): CPR (Attempt to Resuscitate)  Medical Interventions (Patient has pulse or is breathing): Full Support

## 2023-11-28 NOTE — DISCHARGE SUMMARY
[facility] Medicine Services  DISCHARGE SUMMARY    Patient Name: Bia Sims  : 1975  MRN: [id number]    Date of Admission: 2023 10:09 PM  Date of Discharge:    Primary Care Physician: Provider, No Known    Consults       Date and Time Order Name Status Description    2023  2:25 AM Inpatient Urology Consult Completed             Hospital Course     Presenting Problem: obstructive distal ureteral stone    Active Hospital Problems    Diagnosis  POA    **Obstructive uropathy [N13.9]  Yes    History of breast cancer [Z85.3]  Not Applicable    BYRON (obstructive sleep apnea) [G47.33]  Yes    Anxiety and depression [F41.9, F32.A]  Yes    Lactic acidosis [E87.20]  Yes    Left ureteral stone [N20.1]  Yes    Hypoxia [R09.02]  Yes      Resolved Hospital Problems   No resolved problems to display.          Hospital Course:  Bia Sims is a 48 y.o. female w h/o remote kidney stone not requiring intervention who presented with left colicky pain. Found to have 7 mm stone w moderate hydronephrosis. No UTI per u/a, put on cefdinir empirically after stent placement. Lactic acidosis 2/2 vomiting before arrival which resolved w IVF  Dr Soto evaluated patient and completed cystoscopy with ureteral stent insertion and ureteroscopy with laser lithotripsy for distal ureteral stone without complication on . Patient found appropriate for discharge     Discharge Follow Up Recommendations for outpatient labs/diagnostics:   F/u Dr Soto Monday w stent removal    Day of Discharge     HPI:   Patient feeling very well  Some tolerable bladder spasms      Vital Signs:   Temp:  [97.4 °F (36.3 °C)-98.7 °F (37.1 °C)] 97.6 °F (36.4 °C)  Heart Rate:  [] 74  Resp:  [12-20] 18  BP: (115-145)/(64-91) 116/72  Flow (L/min):  [2] 2      Physical Exam:  Constitutional: No acute distress, awake, alert  HENT: NCAT, mucous membranes moist  Respiratory: Clear to auscultation bilaterally,  respiratory effort normal   Cardiovascular: RRR, no murmurs, rubs, or gallops  Gastrointestinal: Soft, nontender, nondistended  Musculoskeletal: Muscle tone within normal limits, no joint effusions appreciated  Psychiatric: Appropriate affect, cooperative  Neurologic: Alert and oriented, facial movements symmetric and spontaneous movement of all 4 extremities grossly equal bilaterally, speech clear  Skin: No rashes    Pertinent  and/or Most Recent Results     LAB RESULTS:      Lab 11/27/23  1213 11/27/23  0614 11/27/23  0333 11/26/23  2347 11/26/23 2207   WBC  --  7.21  --   --  12.14*   HEMOGLOBIN  --  12.4  --   --  16.0*   HEMATOCRIT  --  37.3  --   --  43.5   PLATELETS  --  164  --   --  242   NEUTROS ABS  --   --   --   --  9.84*   IMMATURE GRANS (ABS)  --   --   --   --  0.04   LYMPHS ABS  --   --   --   --  1.55   MONOS ABS  --   --   --   --  0.64   EOS ABS  --   --   --   --  0.03   MCV  --  91.2  --   --  88.6   LACTATE 1.3 2.6* 2.7* 2.4* 3.7*         Lab 11/28/23  0405 11/27/23  0333 11/26/23 2207   SODIUM 141 141 140   POTASSIUM 3.9 4.1 3.7   CHLORIDE 105 108* 103   CO2 26.0 22.0 21.0*   ANION GAP 10.0 11.0 16.0*   BUN 9 10 12   CREATININE 0.56* 0.72 0.89   EGFR 111.3 102.0 79.1   GLUCOSE 115* 113* 129*   CALCIUM 9.1 8.4* 9.9         Lab 11/26/23 2207   TOTAL PROTEIN 7.7   ALBUMIN 4.5   GLOBULIN 3.2   ALT (SGPT) 21   AST (SGOT) 23   BILIRUBIN 0.4   ALK PHOS 83   LIPASE 24                     Brief Urine Lab Results  (Last result in the past 365 days)        Color   Clarity   Blood   Leuk Est   Nitrite   Protein   CREAT   Urine HCG        11/27/23 1907               Negative             Microbiology Results (last 10 days)       Procedure Component Value - Date/Time    Blood Culture - Blood, Hand, Right [[id number]]  (Normal) Collected: 11/27/23 0331    Lab Status: Preliminary result Specimen: Blood from Hand, Right Updated: 11/28/23 0416     Blood Culture No growth at 24 hours    Narrative:      Aerobic  bottle only      Blood Culture - Blood, Hand, Right [[id number]]  (Normal) Collected: 11/27/23 0329    Lab Status: Preliminary result Specimen: Blood from Hand, Right Updated: 11/28/23 0416     Blood Culture No growth at 24 hours    Urine Culture - Urine, Urine, Clean Catch [[id number]]  (Normal) Collected: 11/26/23 2357    Lab Status: Final result Specimen: Urine, Clean Catch Updated: 11/28/23 1017     Urine Culture No growth            FL C Arm During Surgery    Result Date: 11/27/2023  This procedure was auto-finalized with no dictation required.    XR Chest 1 View    Result Date: 11/27/2023  XR CHEST 1 VW Date of Exam: 11/27/2023 3:15 AM EST Indication: HYPOXIA Comparison: 11/27/2023. Findings: There are no airspace consolidations. No pleural fluid. No pneumothorax. The pulmonary vasculature appears within normal limits. The cardiac and mediastinal silhouette appear unremarkable. No acute osseous abnormality identified.     Impression: No acute cardiopulmonary process. Electronically Signed: Hermelinda Kebede MD  11/27/2023 3:30 AM EST  Workstation ID: [id number]    CT Abdomen Pelvis Without Contrast    Result Date: 11/26/2023  CT ABDOMEN PELVIS WO CONTRAST Date of Exam: 11/26/2023 10:20 PM EST Indication: Flank pain, kidney stone suspected Nausea/vomiting had 4 mm proximal left UPJ stone 10/23/23, recurrent pain/vomiting. Comparison: 10/23/2023. Technique: Axial CT images were obtained of the abdomen and pelvis without the administration of contrast. Reconstructed coronal and sagittal images were also obtained. Automated exposure control and iterative construction methods were used. Findings: Lung Bases:   The visualized lung bases and lower mediastinal structures are unremarkable. Limited evaluation of the solid organs due to lack of intravenous contrast. Liver: Liver is normal in size and CT density. Simple cyst present within the left hepatic lobe.. Biliary/Gallbladder:  The gallbladder is normal without evidence of  radiopaque stones. The biliary tree is nondilated. Spleen: Spleen is normal in size and CT density. Pancreas:  Pancreas is normal. There is no evidence of pancreatic mass or peripancreatic fluid. Kidneys:  Kidneys are normal in size. There is an obstructing calculus present within the distal left ureter measuring up to 7 mm in craniocaudal dimension (series 2 image 125). There is moderate proximal hydroureteronephrosis. Mild stranding is seen surrounding the left kidney likely related to increased back pressure. No additional stones identified.. Adrenals:  Adrenal glands are unremarkable. Retroperitoneal/Lymph Nodes/Vasculature:  No retroperitoneal adenopathy is identified. Gastrointestinal/Mesentery:  The bowel loops are non-dilated without wall thickening or mass. The appendix appears within normal limits. No evidence of obstruction. No free air. No mesenteric fluid collections identified. Bladder:  The bladder is normal. Genital:   Unremarkable       Bony Structures:   Visualized bony structures are consistent with the patient's age.     Impression: 1.Obstructing calculus present within the distal left ureter measuring up to 7 mm with moderate proximal hydroureteronephrosis. No additional stones identified. 2.Ancillary findings as described above. Electronically Signed: Hermelinda Kebede MD  11/26/2023 10:35 PM EST  Workstation ID: [id number]                 Plan for Follow-up of Pending Labs/Results: ngtd  Pending Labs       Order Current Status    Blood Culture - Blood, Hand, Right Preliminary result    Blood Culture - Blood, Hand, Right Preliminary result          Discharge Details        Discharge Medications        New Medications        Instructions Start Date   cefdinir 300 MG capsule  Commonly known as: OMNICEF   300 mg, Oral, 2 Times Daily      HYDROcodone-acetaminophen 5-325 MG per tablet  Commonly known as: NORCO   1 tablet, Oral, Every 6 Hours PRN      hyoscyamine 0.125 MG SL tablet  Commonly known as:  LEVSIN   0.125 mg, Sublingual, Every 4 Hours PRN      phenazopyridine 200 MG tablet  Commonly known as: PYRIDIUM   200 mg, Oral, 3 Times Daily             Continue These Medications        Instructions Start Date   ondansetron ODT 4 MG disintegrating tablet  Commonly known as: ZOFRAN-ODT   4 mg, Translingual, Every 6 Hours PRN      sertraline 25 MG tablet  Commonly known as: ZOLOFT   75 mg, Oral, Daily      tamsulosin 0.4 MG capsule 24 hr capsule  Commonly known as: FLOMAX   0.4 mg, Oral, Daily               Allergies   Allergen Reactions    Vancomycin Anaphylaxis    Latex Rash         Discharge Disposition:  Home or Self Care    Diet:  Hospital:No active diet order           Activity:      Restrictions or Other Recommendations:  No driving while on opioid pain medications       CODE STATUS:    Code Status and Medical Interventions:   Ordered at: 11/27/23 0122     Level Of Support Discussed With:    Patient     Code Status (Patient has no pulse and is not breathing):    CPR (Attempt to Resuscitate)     Medical Interventions (Patient has pulse or is breathing):    Full Support       Future Appointments   Date Time Provider Department Center   12/4/2023  8:00 AM Fatemeh Soto MD [facility]       Additional Instructions for the Follow-ups that You Need to Schedule       Discharge Follow-up with Specified Provider: Dr Soto Monday - ensure scheduled before discharged   As directed      To: Dr Soto Monday - ensure scheduled before discharged                      Susie Penaloza MD  11/28/23      Time Spent on Discharge:  I spent 25 minutes on this discharge activity which included: face-to-face encounter with the patient, reviewing the data in the system, coordination of the care with the nursing staff as well as consultants, documentation, and entering orders.

## 2023-11-28 NOTE — ANESTHESIA PROCEDURE NOTES
Airway  Urgency: elective    Date/Time: 11/27/2023 7:31 PM  Airway not difficult    General Information and Staff    Patient location during procedure: OR  Anesthesiologist: Tanner Jones MD    Indications and Patient Condition  Indications for airway management: airway protection    Preoxygenated: yes  Mask difficulty assessment: 1 - vent by mask    Final Airway Details  Final airway type: supraglottic airway      Successful airway: I-gel  Size 4     Number of attempts at approach: 1  Assessment: lips, teeth, and gum same as pre-op    Additional Comments  LMA placed without difficulty, ventilation with assist, equal breath sounds and symmetric chest rise and fall

## 2023-11-28 NOTE — OP NOTE
CYSTOSCOPY URETERAL CATHETER/STENT INSERTION, URETEROSCOPY LASER LITHOTRIPSY WITH STENT INSERTION  Procedure Note    Bia Sims  11/27/2023    Pre-op Diagnosis:   ureteral stone    Post-op Diagnosis:     Post-Op Diagnosis Codes:     * Left ureteral stone [N20.1]    Procedure/CPT® Codes:  DC CYSTO/URETERO W/LITHOTRIPSY &INDWELL STENT INSRT [84526]  CHG UROGRAPHY RETROGRADE WITH/WO KUB [63341]    Procedure(s):  CYSTOSCOPY URETERAL STENT INSERTION  POSSIBLE URETEROSCOPY LASER LITHOTRIPSY    Surgeon(s):  Fatemeh Soto MD    Anesthesia: see anesthesia record    Staff:   Circulator: Ko Tesfaye RN  Laser Staff: Nneka Cole RN  Radiology Technologist: Gopi Beaulieu  Scrub Person: Elizabeth Lemus    Estimated Blood Loss: minimal  Urine Voided: * No values recorded between 11/27/2023  7:25 PM and 11/27/2023  8:03 PM *    Specimens:                None      Drains: 6f x 24 cm JJ left ureteral stent    Findings:       Blood: N/A    Complications: None immediate    Indication for Procedure: Ms. Sims is a 48-year-old female who presented with a left obstructing ureteral stone.  She had uncontrolled pain and was admitted to the hospitalist service.  She was brought to the operating room today for ureteroscopy laser lithotripsy and stent placement.    Description of Procedure: The patient was seen and examined in the preoperative area.  The risks, benefits, and alternatives were explained to the patient and family.  Informed consent was obtained.  The patient was then taken back to the operating theater and placed on the table in a supine position.  Venodyne boots were placed on the bilateral lower extremities.  General anesthesia was induced without any complication.  They were then placed in the dorsal lithotomy position with all pressure points padded and secured.  The patient was prepped and draped in the usual sterile fashion and a timeout was taken.      A 22 Welsh rigid cystoscope was passed  through the urethra and into the bladder.  A cystoscopy was performed in a systematic fashion there were no masses or lesions noted within the walls of the bladder.  Attention was then turned to the left ureteral orifice and a 0.035 sensor wire was advanced through the scope into the left renal pelvis under fluoroscopic guidance.  A semirigid scope was then advanced into the left ureter and a 7 to 8 mm stone was encountered in the distal ureter.  Using a 200 µm laser fiber the stone was completely fragmented to dust.  The scope was then advanced all the way into the proximal ureter no further stone or large fragments were encountered.    Retrograde pyelogram was then performed there was no filling defect or hydronephrosis noted.    The semirigid scope was withdrawn and a 6 Yi by 24 cm double-J ureteral stent without strings was advanced over the wire into the left renal pelvis under fluoroscopic guidance.  Once in position the wire was pulled and the stent was deployed.  The bladder was drained and she was given lidocaine jelly.  The patient tolerated the procedure well and there were no complications to the procedure.  She was taken to PACU in stable next bed condition.    Disposition: The patient will be discharge when PACU criteria is met.  We will plan on stent removal in the office in 5 days.   The intraoperative findings and postoperative plans were discussed with the patient and family.     Fatemeh Soto MD     Date: 11/27/2023  Time: 20:03 EST

## 2023-11-28 NOTE — PLAN OF CARE
Problem: Adult Inpatient Plan of Care  Goal: Plan of Care Review  Outcome: Ongoing, Progressing  Flowsheets  Taken 11/28/2023 0323 by Eva Sargent RN  Progress: improving  Outcome Evaluation: Pt is A&Ox4. Up independently. Ambulating frequently in riojas. Minimal complaints of pain controlled w/ meds per order. Urine is light pink in color. UO appropriate. IV hydration maintained. Tolerating food and PO fluids. RA. VSS. NSR on tele.  Taken 11/27/2023 1846 by Jennifer Winslow RN  Plan of Care Reviewed With: patient  Goal: Absence of Hospital-Acquired Illness or Injury  Outcome: Ongoing, Progressing  Intervention: Identify and Manage Fall Risk  Recent Flowsheet Documentation  Taken 11/28/2023 0204 by Eva Sargent RN  Safety Promotion/Fall Prevention:   activity supervised   assistive device/personal items within reach   clutter free environment maintained   elopement precautions   fall prevention program maintained   nonskid shoes/slippers when out of bed   room organization consistent   safety round/check completed  Taken 11/28/2023 0000 by Eva Sargent RN  Safety Promotion/Fall Prevention:   activity supervised   assistive device/personal items within reach   clutter free environment maintained   fall prevention program maintained   elopement precautions   nonskid shoes/slippers when out of bed   room organization consistent   safety round/check completed  Taken 11/27/2023 2200 by Eva Sragent RN  Safety Promotion/Fall Prevention:   activity supervised   assistive device/personal items within reach   clutter free environment maintained   elopement precautions   fall prevention program maintained   nonskid shoes/slippers when out of bed   room organization consistent   safety round/check completed  Taken 11/27/2023 2049 by Eva Sargent RN  Safety Promotion/Fall Prevention:   activity supervised   assistive device/personal items within reach   clutter free environment maintained   elopement  precautions   fall prevention program maintained   gait belt   nonskid shoes/slippers when out of bed   room organization consistent   safety round/check completed   toileting scheduled  Intervention: Prevent Skin Injury  Recent Flowsheet Documentation  Taken 11/28/2023 0204 by Eva Sargent RN  Body Position: position changed independently  Taken 11/28/2023 0000 by Eva Sargent RN  Body Position: position changed independently  Taken 11/27/2023 2200 by Eva Sargent RN  Body Position: position changed independently  Taken 11/27/2023 2049 by Eva Sargent RN  Body Position: position changed independently  Skin Protection:   adhesive use limited   tubing/devices free from skin contact  Intervention: Prevent and Manage VTE (Venous Thromboembolism) Risk  Recent Flowsheet Documentation  Taken 11/28/2023 0204 by Eva Sargent RN  Activity Management: ambulated to bathroom  Taken 11/28/2023 0000 by Eva Sargent RN  Activity Management: ambulated outside room  Taken 11/27/2023 2200 by Eva Sargent RN  Activity Management: ambulated to bathroom  Taken 11/27/2023 2049 by Eva Sargent RN  VTE Prevention/Management: patient refused intervention  Intervention: Prevent Infection  Recent Flowsheet Documentation  Taken 11/28/2023 0204 by Eva Sargent RN  Infection Prevention: rest/sleep promoted  Taken 11/28/2023 0000 by Eva Sargent RN  Infection Prevention: rest/sleep promoted  Taken 11/27/2023 2049 by Eva Sargent RN  Infection Prevention:   environmental surveillance performed   rest/sleep promoted  Goal: Optimal Comfort and Wellbeing  Outcome: Ongoing, Progressing  Intervention: Monitor Pain and Promote Comfort  Recent Flowsheet Documentation  Taken 11/28/2023 0000 by Eva Sargent RN  Pain Management Interventions: see MAR  Intervention: Provide Person-Centered Care  Recent Flowsheet Documentation  Taken 11/27/2023 2049 by Eva Sargent RN  Trust  Relationship/Rapport:   care explained   questions answered   thoughts/feelings acknowledged   reassurance provided  Goal: Readiness for Transition of Care  Outcome: Ongoing, Progressing     Problem: Pain Acute  Goal: Acceptable Pain Control and Functional Ability  Outcome: Ongoing, Progressing  Intervention: Prevent or Manage Pain  Recent Flowsheet Documentation  Taken 11/27/2023 2049 by Eva Sargent RN  Medication Review/Management: medications reviewed  Intervention: Develop Pain Management Plan  Recent Flowsheet Documentation  Taken 11/28/2023 0000 by Eva Sargent RN  Pain Management Interventions: see MAR  Intervention: Optimize Psychosocial Wellbeing  Recent Flowsheet Documentation  Taken 11/27/2023 2049 by Eva Sargent RN  Diversional Activities:   smartphone   television   Goal Outcome Evaluation:           Progress: improving  Outcome Evaluation: Pt is A&Ox4. Up independently. Ambulating frequently in riojas. Minimal complaints of pain controlled w/ meds per order. Urine is light pink in color. UO appropriate. IV hydration maintained. Tolerating food and PO fluids. RA. VSS. NSR on tele.

## 2023-11-28 NOTE — ANESTHESIA POSTPROCEDURE EVALUATION
Patient: Bia Sims    Procedure Summary       Date: 11/27/23 [facility] / [facility]    Anesthesia Start: 1926 Anesthesia Stop: 2006    Procedures:       CYSTOSCOPY URETERAL STENT INSERTION (Left: Bladder)      POSSIBLE URETEROSCOPY LASER LITHOTRIPSY (Left) Diagnosis:       Left ureteral stone      (ureteral stone)    Surgeons: Fatemeh Soto MD Provider: Tanner Jones MD    Anesthesia Type: general ASA Status: 3            Anesthesia Type: general    Vitals  Vitals Value Taken Time   /83 11/27/23 2006   Temp 97.9 °F (36.6 °C) 11/27/23 2006   Pulse 101 11/27/23 2006   Resp     SpO2 97 % 11/27/23 2006           Post Anesthesia Care and Evaluation    Patient location during evaluation: PACU  Patient participation: complete - patient participated  Level of consciousness: awake and alert  Pain management: adequate    Airway patency: patent  Anesthetic complications: No anesthetic complications  PONV Status: none  Cardiovascular status: hemodynamically stable and acceptable  Respiratory status: nonlabored ventilation, acceptable and nasal cannula  Hydration status: acceptable

## 2023-12-01 ENCOUNTER — TELEPHONE (OUTPATIENT)
Dept: UROLOGY | Facility: CLINIC | Age: 48
End: 2023-12-01
Payer: COMMERCIAL

## 2023-12-01 DIAGNOSIS — N20.0 NEPHROLITHIASIS: Primary | ICD-10-CM

## 2023-12-01 RX ORDER — PHENAZOPYRIDINE HYDROCHLORIDE 200 MG/1
200 TABLET, FILM COATED ORAL 3 TIMES DAILY
Qty: 6 TABLET | Refills: 0 | Status: SHIPPED | OUTPATIENT
Start: 2023-12-01 | End: 2023-12-03

## 2023-12-01 NOTE — TELEPHONE ENCOUNTER
Hub staff attempted to follow warm transfer process and was unsuccessful     Caller  Bia Sims     Relationship to patient: SELF    Best call back number: [phone]     Patient is needing: PAT WOULD LIKE TO GET REFILL OF phenazopyridine (PYRIDIUM) 200 MG tablet SENT TO:    [facility] [id number] - [place] [id number] [facility]  AT [facility] & [address] - [phone]  - [phone] FX         PLEASE CALL PAT TO LET KNOW WHEN IT SENT OR WHAT CAN BE DONE OTHERWISE. THANK YOU

## 2023-12-02 LAB
BACTERIA SPEC AEROBE CULT: NORMAL
BACTERIA SPEC AEROBE CULT: NORMAL

## 2023-12-04 ENCOUNTER — PROCEDURE VISIT (OUTPATIENT)
Dept: UROLOGY | Facility: CLINIC | Age: 48
End: 2023-12-04
Payer: COMMERCIAL

## 2023-12-04 VITALS
SYSTOLIC BLOOD PRESSURE: 148 MMHG | HEART RATE: 112 BPM | HEIGHT: 62 IN | WEIGHT: 250 LBS | OXYGEN SATURATION: 98 % | BODY MASS INDEX: 46.01 KG/M2 | DIASTOLIC BLOOD PRESSURE: 92 MMHG

## 2023-12-04 DIAGNOSIS — N20.0 NEPHROLITHIASIS: ICD-10-CM

## 2023-12-04 DIAGNOSIS — N30.00 ACUTE CYSTITIS WITHOUT HEMATURIA: Primary | ICD-10-CM

## 2023-12-04 LAB
BILIRUB BLD-MCNC: ABNORMAL MG/DL
CLARITY, POC: CLEAR
COLOR UR: YELLOW
EXPIRATION DATE: ABNORMAL
GLUCOSE UR STRIP-MCNC: ABNORMAL MG/DL
KETONES UR QL: ABNORMAL
LEUKOCYTE EST, POC: ABNORMAL
Lab: ABNORMAL
NITRITE UR-MCNC: POSITIVE MG/ML
PH UR: 5 [PH] (ref 5–8)
PROT UR STRIP-MCNC: ABNORMAL MG/DL
RBC # UR STRIP: ABNORMAL /UL
SP GR UR: 1.03 (ref 1–1.03)
UROBILINOGEN UR QL: ABNORMAL

## 2023-12-04 PROCEDURE — 81003 URINALYSIS AUTO W/O SCOPE: CPT | Performed by: UROLOGY

## 2023-12-04 PROCEDURE — 87086 URINE CULTURE/COLONY COUNT: CPT | Performed by: UROLOGY

## 2023-12-04 PROCEDURE — 52310 CYSTOSCOPY AND TREATMENT: CPT | Performed by: UROLOGY

## 2023-12-04 RX ORDER — PHENAZOPYRIDINE HYDROCHLORIDE 200 MG/1
200 TABLET, FILM COATED ORAL 3 TIMES DAILY PRN
Qty: 20 TABLET | Refills: 0 | Status: SHIPPED | OUTPATIENT
Start: 2023-12-04

## 2023-12-04 RX ORDER — IBUPROFEN 800 MG/1
800 TABLET ORAL EVERY 8 HOURS PRN
Qty: 20 TABLET | Refills: 0 | Status: SHIPPED | OUTPATIENT
Start: 2023-12-04

## 2023-12-04 RX ORDER — KETOROLAC TROMETHAMINE 30 MG/ML
30 INJECTION, SOLUTION INTRAMUSCULAR; INTRAVENOUS ONCE
Status: COMPLETED | OUTPATIENT
Start: 2023-12-04 | End: 2023-12-04

## 2023-12-04 RX ORDER — CEFTRIAXONE 1 G/1
1 INJECTION, POWDER, FOR SOLUTION INTRAMUSCULAR; INTRAVENOUS ONCE
Status: COMPLETED | OUTPATIENT
Start: 2023-12-04 | End: 2023-12-04

## 2023-12-04 RX ADMIN — CEFTRIAXONE 1 G: 1 INJECTION, POWDER, FOR SOLUTION INTRAMUSCULAR; INTRAVENOUS at 10:28

## 2023-12-04 RX ADMIN — KETOROLAC TROMETHAMINE 30 MG: 30 INJECTION, SOLUTION INTRAMUSCULAR; INTRAVENOUS at 10:28

## 2023-12-04 NOTE — PROGRESS NOTES
Procedure: Flexible Cystoscopy with Stent Removal     Preoperative Diagnosis: Nephrolithiasis     Postoperative Diagnosis: Nephrolithiasis     Procedure performed: Cystoscopy with left stent removal     Surgeon: Andrzej Griffin MA    Anesthesia: 2% Lidocaine Jelly    Indications: Bia Sims is a 48 y.o. year old female with a history of left nephrolithiasis who underwent definitive stone treatment. A ureteral stent was left in place. The patient returns for planned ureteral stent removal today.  Pt U/A nitrite positive but is on pyridium.  She has been on Omnicef.     Procedure: Patient was taken to the urology procedure suite and prepped and draped in sterile fashion. A pre-procedural identification was performed. 10 cc of 2% lidocaine jelly was injected into the urethra. After adequate anesthesia, a lubricated flexible cystoscope was inserted into the urethra. The urethra appeared normal. The urinary sphincter was intact. The bladder was entered and 360 degree panendoscopy was performed revealing normal bladder anatomy, bilateral orthotopic ureteral orifices, and no concern for bladder stones, trabeculations, mucosal lesions/tumors, or divetrticuli. The left stent was in good position emanating from the ureteral orifice.      The left stent was grasped with a pair of grasping forceps and was removed without difficulty.     Plan:     1) Discharge home    2) Follow up: 6 weeks    3) Antibiotic prophylaxis:  Rocephin, Toradol

## 2023-12-05 LAB — BACTERIA SPEC AEROBE CULT: NO GROWTH

## 2025-05-02 ENCOUNTER — HOSPITAL ENCOUNTER (OUTPATIENT)
Dept: CT IMAGING | Facility: HOSPITAL | Age: 50
Discharge: HOME | End: 2025-05-02
Admitting: UROLOGY
Payer: COMMERCIAL

## 2025-05-02 ENCOUNTER — OFFICE VISIT (OUTPATIENT)
Dept: UROLOGY | Facility: CLINIC | Age: 50
End: 2025-05-02
Payer: COMMERCIAL

## 2025-05-02 VITALS — WEIGHT: 261.6 LBS | HEIGHT: 62 IN | BODY MASS INDEX: 48.14 KG/M2

## 2025-05-02 DIAGNOSIS — N20.0 NEPHROLITHIASIS: ICD-10-CM

## 2025-05-02 DIAGNOSIS — N20.0 NEPHROLITHIASIS: Primary | ICD-10-CM

## 2025-05-02 LAB
BILIRUB BLD-MCNC: NEGATIVE MG/DL
CLARITY, POC: CLEAR
COLOR UR: YELLOW
EXPIRATION DATE: ABNORMAL
GLUCOSE UR STRIP-MCNC: NEGATIVE MG/DL
KETONES UR QL: NEGATIVE
LEUKOCYTE EST, POC: NEGATIVE
Lab: ABNORMAL
NITRITE UR-MCNC: NEGATIVE MG/ML
PH UR: 6 [PH] (ref 5–8)
PROT UR STRIP-MCNC: NEGATIVE MG/DL
RBC # UR STRIP: NEGATIVE /UL
SP GR UR: 1.02 (ref 1–1.03)
UROBILINOGEN UR QL: NORMAL

## 2025-05-02 PROCEDURE — 74176 CT ABD & PELVIS W/O CONTRAST: CPT

## 2025-05-02 PROCEDURE — 81003 URINALYSIS AUTO W/O SCOPE: CPT | Performed by: UROLOGY

## 2025-05-02 PROCEDURE — 99214 OFFICE O/P EST MOD 30 MIN: CPT | Performed by: UROLOGY

## 2025-05-02 RX ORDER — LANOLIN ALCOHOL/MO/W.PET/CERES
1000 CREAM (GRAM) TOPICAL DAILY
COMMUNITY

## 2025-05-02 NOTE — PROGRESS NOTES
Follow Up Office Visit      Patient Name: Bia Sims  : 1975   MRN: [id number]     Chief Complaint:    Chief Complaint   Patient presents with    Nephrolithiasis       Referring Provider: No ref. provider found    History of Present Illness: Bia Sims is a 49 y.o. female who presents today for follow up of nephrolithiasis.  She underwent ureteroscopy in  and has done well since.  On  she started having back pain and had flank pain.  Her pain has been on the left side.  No dysuria or gross hematuria.  Her pain improved with ibuprofen.  No nausea or vomiting.      Subjective      Review of System:   Review of Systems   I have reviewed the ROS documented by my clinical staff, I have updated appropriately and I agree. Fatemeh Soto MD    I have reviewed and the following portions of the patient's history were updated as appropriate: past family history, past medical history, past social history, past surgical history and problem list.    Past Medical History:   Past Medical History:   Diagnosis Date    Breast cancer     Left    Kidney stones     Sleep apnea        Past Surgical History:  Past Surgical History:   Procedure Laterality Date    BREAST SURGERY      URETEROSCOPY LASER LITHOTRIPSY WITH STENT INSERTION Left 2023    Procedure: CYSTOSCOPY, URETEROSCOPY LASER LITHOTRIPSY;  Surgeon: Fatemeh Soto MD;  Location: [facility];  Service: Urology;  Laterality: Left;       Family History:   family history is not on file.   Otherwise pertinent FHx was reviewed and not pertinent to current issue.    Social History:    reports that she has never smoked. She has never been exposed to tobacco smoke. She has never used smokeless tobacco. She reports that she does not drink alcohol and does not use drugs.    Medications:     Current Outpatient Medications:     Diclofenac Sodium (VOLTAREN) 1 % gel gel, Place  on the skin as directed by provider., Disp: , Rfl:     sertraline (ZOLOFT) 25 MG  "tablet, Take 3 tablets by mouth Daily. (Patient taking differently: Take 2 tablets by mouth Daily.), Disp: , Rfl:     hyoscyamine (LEVSIN) 0.125 MG SL tablet, Place 1 tablet under the tongue Every 4 (Four) Hours As Needed (bladder spasms). (Patient not taking: Reported on 5/2/2025), Disp: 12 tablet, Rfl: 0    ibuprofen (ADVIL,MOTRIN) 800 MG tablet, Take 1 tablet by mouth Every 8 (Eight) Hours As Needed for Moderate Pain. (Patient not taking: Reported on 5/2/2025), Disp: 20 tablet, Rfl: 0    ondansetron ODT (ZOFRAN-ODT) 4 MG disintegrating tablet, Place 1 tablet on the tongue Every 6 (Six) Hours As Needed for Nausea or Vomiting. (Patient not taking: Reported on 5/2/2025), Disp: 20 tablet, Rfl: 0    phenazopyridine (Pyridium) 200 MG tablet, Take 1 tablet by mouth 3 (Three) Times a Day As Needed for Bladder Spasms. (Patient not taking: Reported on 5/2/2025), Disp: 20 tablet, Rfl: 0    tamsulosin (FLOMAX) 0.4 MG capsule 24 hr capsule, Take 1 capsule by mouth Daily. (Patient not taking: Reported on 5/2/2025), Disp: 30 capsule, Rfl: 0    vitamin B-12 (CYANOCOBALAMIN) 1000 MCG tablet, Take 1 tablet by mouth Daily., Disp: , Rfl:     Allergies:   Allergies   Allergen Reactions    Vancomycin Anaphylaxis    Latex Rash             Objective     Physical Exam:   Vital Signs:   Vitals:    05/02/25 1009   Weight: 119 kg (261 lb 9.6 oz)   Height: 157.5 cm (62.01\")  Comment: pt stated height   PainSc: 0-No pain     Body mass index is 47.83 kg/m².     Physical Exam  Vitals and nursing note reviewed. Exam conducted with a chaperone present.   Constitutional:       General: She is awake. She is not in acute distress.     Appearance: Normal appearance.   HENT:      Head: Normocephalic and atraumatic.      Right Ear: External ear normal.      Left Ear: External ear normal.      Nose: Nose normal.   Eyes:      Conjunctiva/sclera: Conjunctivae normal.   Pulmonary:      Effort: Pulmonary effort is normal. No respiratory distress. " "  Abdominal:      General: Abdomen is flat. There is no distension.      Palpations: Abdomen is soft. There is no mass.      Tenderness: There is no abdominal tenderness. There is no right CVA tenderness, left CVA tenderness, guarding or rebound.      Hernia: No hernia is present.   Genitourinary:     Exam position: Lithotomy position.   Skin:     General: Skin is warm.   Neurological:      General: No focal deficit present.      Mental Status: She is alert and oriented to person, place, and time.      Gait: Gait normal.   Psychiatric:         Behavior: Behavior normal. Behavior is cooperative.         Thought Content: Thought content normal.         Judgment: Judgment normal.         Labs:   Brief Urine Lab Results  (Last result in the past 365 days)        Color   Clarity   Blood   Leuk Est   Nitrite   Protein   CREAT   Urine HCG        05/02/25 1017 Yellow   Clear   Negative   Negative   Negative   Negative                        Lab Results   Component Value Date    GLUCOSE 115 (H) 11/28/2023    CALCIUM 9.1 11/28/2023     11/28/2023    K 3.9 11/28/2023    CO2 26.0 11/28/2023     11/28/2023    BUN 9 11/28/2023    CREATININE 0.56 (L) 11/28/2023    BCR 16.1 11/28/2023    ANIONGAP 10.0 11/28/2023       Lab Results   Component Value Date    WBC 5.51 04/01/2025    HGB 13.2 04/01/2025    HCT 40.7 04/01/2025    MCV 91 04/01/2025     04/01/2025       No results found for: \"PSA\"    Images:   XR Shoulder 2+ View Left  Result Date: 4/28/2025  1. Calcific tendinosis in the left subscapularis tendon. 2. Moderate osteoarthritis of the right knee. 3. Mild osteoarthritis of the left knee. CRITICAL RESULT:   No. COMMUNICATION: Per this written report. Drafted by Dhiraj Croft MD on 4/28/2025 4:40 PM Final report signed by Dhiraj Croft MD on 4/28/2025 4:43 PM    XR Knee 4+ View Left  Result Date: 4/28/2025  1. Calcific tendinosis in the left subscapularis tendon. 2. Moderate osteoarthritis of the right " knee. 3. Mild osteoarthritis of the left knee. CRITICAL RESULT:   No. COMMUNICATION: Per this written report. Drafted by Dhiraj Croft MD on 4/28/2025 4:40 PM Final report signed by Dhiraj Croft MD on 4/28/2025 4:43 PM    XR Knee 4+ View Left  Result Date: 4/28/2025  1. Calcific tendinosis in the left subscapularis tendon. 2. Moderate osteoarthritis of the right knee. 3. Mild osteoarthritis of the left knee. CRITICAL RESULT:   No. COMMUNICATION: Per this written report. Drafted by Dhiraj Croft MD on 4/28/2025 4:40 PM Final report signed by Dhiraj Croft MD on 4/28/2025 4:43 PM    XR Elbow 3+ View Right  Result Date: 4/22/2025  Degenerative enthesopathic changes of the lateral epicondyle that may be an indication of common extensor tendon pathology. CRITICAL RESULT:   No. COMMUNICATION: Per this written report. Drafted by Dhiraj Croft MD on 4/22/2025 3:52 PM Final report signed by Dhriaj Croft MD on 4/22/2025 3:52 PM      Measures:   Tobacco:   Bia Sims  reports that she has never smoked. She has never been exposed to tobacco smoke. She has never used smokeless tobacco.       Urine Incontinence: Patient reports that she is not currently experiencing any symptoms of urinary incontinence.       Assessment / Plan      Assessment/Plan:   49 y.o. female who presented today for follow up of nephrolithiasis.  She started having pain again on Sunday.  Her pain has improved with ibuprofen but is still there.  She has not seen a fragment pass.  I will obtain CT stone and see her back early next week.     Diagnoses and all orders for this visit:    1. Nephrolithiasis (Primary)  -     POC Urinalysis Dipstick, Automated  -     CT Abdomen Pelvis Stone Protocol; Future         Follow Up:   Return in about 1 week (around 5/9/2025) for Recheck.    I spent approximately 30 minutes providing clinical care for this patient; including review of patient's chart and provider documentation, face to face time  spent with patient in examination room (obtaining history, performing physical exam, discussing diagnosis and management options), placing orders, and completing patient documentation.     Fatemeh Soto MD  [facility] [place]

## 2025-05-08 ENCOUNTER — OFFICE VISIT (OUTPATIENT)
Dept: UROLOGY | Facility: CLINIC | Age: 50
End: 2025-05-08
Payer: COMMERCIAL

## 2025-05-08 VITALS — HEIGHT: 62 IN | WEIGHT: 261 LBS | BODY MASS INDEX: 48.03 KG/M2

## 2025-05-08 DIAGNOSIS — N20.0 NEPHROLITHIASIS: Primary | ICD-10-CM

## 2025-05-08 PROCEDURE — 99214 OFFICE O/P EST MOD 30 MIN: CPT | Performed by: UROLOGY

## 2025-05-08 NOTE — PROGRESS NOTES
Follow Up Office Visit      Patient Name: Bia Sims  : 1975   MRN: [id number]     Chief Complaint:    Chief Complaint   Patient presents with    Nephrolithiasis       Referring Provider: No ref. provider found    History of Present Illness: Bia Sims is a 49 y.o. female who presents today for follow up of nephrolithiasis.  She was found to have a 2.5 mm stone in the distal left ureter.  She comes back today and is not in any pain.  She may have passed her stone.  No dysuria or gross hematuria.      Subjective      Review of System:   Review of Systems   I have reviewed the ROS documented by my clinical staff, I have updated appropriately and I agree. Fatemeh Soto MD    I have reviewed and the following portions of the patient's history were updated as appropriate: past family history, past medical history, past social history, past surgical history and problem list.    Past Medical History:   Past Medical History:   Diagnosis Date    Breast cancer     Left    Kidney stones     Sleep apnea        Past Surgical History:  Past Surgical History:   Procedure Laterality Date    BREAST SURGERY      URETEROSCOPY LASER LITHOTRIPSY WITH STENT INSERTION Left 2023    Procedure: CYSTOSCOPY, URETEROSCOPY LASER LITHOTRIPSY;  Surgeon: Fatemeh Soto MD;  Location: [facility];  Service: Urology;  Laterality: Left;       Family History:   family history is not on file.   Otherwise pertinent FHx was reviewed and not pertinent to current issue.    Social History:    reports that she has never smoked. She has never been exposed to tobacco smoke. She has never used smokeless tobacco. She reports that she does not drink alcohol and does not use drugs.    Medications:     Current Outpatient Medications:     Diclofenac Sodium (VOLTAREN) 1 % gel gel, Place  on the skin as directed by provider., Disp: , Rfl:     sertraline (ZOLOFT) 25 MG tablet, Take 3 tablets by mouth Daily. (Patient taking differently: Take 2  "tablets by mouth Daily.), Disp: , Rfl:     vitamin B-12 (CYANOCOBALAMIN) 1000 MCG tablet, Take 1 tablet by mouth Daily., Disp: , Rfl:     Allergies:   Allergies   Allergen Reactions    Vancomycin Anaphylaxis    Latex Rash       Objective     Physical Exam:   Vital Signs:   Vitals:    05/08/25 1553   Weight: 118 kg (261 lb)   Height: 157.5 cm (62\")   PainSc: 0-No pain     Body mass index is 47.74 kg/m².     Physical Exam  Vitals and nursing note reviewed. Exam conducted with a chaperone present.   Constitutional:       General: She is awake. She is not in acute distress.     Appearance: Normal appearance.   HENT:      Head: Normocephalic and atraumatic.      Right Ear: External ear normal.      Left Ear: External ear normal.      Nose: Nose normal.   Eyes:      Conjunctiva/sclera: Conjunctivae normal.   Pulmonary:      Effort: Pulmonary effort is normal. No respiratory distress.   Abdominal:      General: Abdomen is flat. There is no distension.      Palpations: Abdomen is soft. There is no mass.      Tenderness: There is no abdominal tenderness. There is no right CVA tenderness, left CVA tenderness, guarding or rebound.      Hernia: No hernia is present.   Genitourinary:     Exam position: Lithotomy position.   Skin:     General: Skin is warm.   Neurological:      General: No focal deficit present.      Mental Status: She is alert and oriented to person, place, and time.      Gait: Gait normal.   Psychiatric:         Behavior: Behavior normal. Behavior is cooperative.         Thought Content: Thought content normal.         Judgment: Judgment normal.         Labs:   Brief Urine Lab Results  (Last result in the past 365 days)        Color   Clarity   Blood   Leuk Est   Nitrite   Protein   CREAT   Urine HCG        05/02/25 1017 Yellow   Clear   Negative   Negative   Negative   Negative                        Lab Results   Component Value Date    GLUCOSE 115 (H) 11/28/2023    CALCIUM 9.1 11/28/2023     11/28/2023 " "   K 3.9 11/28/2023    CO2 26.0 11/28/2023     11/28/2023    BUN 9 11/28/2023    CREATININE 0.56 (L) 11/28/2023    BCR 16.1 11/28/2023    ANIONGAP 10.0 11/28/2023       Lab Results   Component Value Date    WBC 5.51 04/01/2025    HGB 13.2 04/01/2025    HCT 40.7 04/01/2025    MCV 91 04/01/2025     04/01/2025       No results found for: \"PSA\"    Images:   CT Abdomen Pelvis Stone Protocol  Result Date: 5/2/2025  Impression: Left renal obstructive uropathy due to a 2.5 mm calculus in the distal left ureter. This is similar in location to a larger calculus located in the distal left ureter on prior examination. Electronically Signed: Tennille Page MD  5/2/2025 12:31 PM EDT  Workstation ID: [id number]    XR Shoulder 2+ View Left  Result Date: 4/28/2025  1. Calcific tendinosis in the left subscapularis tendon. 2. Moderate osteoarthritis of the right knee. 3. Mild osteoarthritis of the left knee. CRITICAL RESULT:   No. COMMUNICATION: Per this written report. Drafted by Dhiraj Croft MD on 4/28/2025 4:40 PM Final report signed by Dhiraj Croft MD on 4/28/2025 4:43 PM    XR Knee 4+ View Left  Result Date: 4/28/2025  1. Calcific tendinosis in the left subscapularis tendon. 2. Moderate osteoarthritis of the right knee. 3. Mild osteoarthritis of the left knee. CRITICAL RESULT:   No. COMMUNICATION: Per this written report. Drafted by Dhiraj Croft MD on 4/28/2025 4:40 PM Final report signed by Dhiraj Croft MD on 4/28/2025 4:43 PM    XR Knee 4+ View Left  Result Date: 4/28/2025  1. Calcific tendinosis in the left subscapularis tendon. 2. Moderate osteoarthritis of the right knee. 3. Mild osteoarthritis of the left knee. CRITICAL RESULT:   No. COMMUNICATION: Per this written report. Drafted by Dhiraj Croft MD on 4/28/2025 4:40 PM Final report signed by Dhiraj Croft MD on 4/28/2025 4:43 PM    XR Elbow 3+ View Right  Result Date: 4/22/2025  Degenerative enthesopathic changes of the lateral " epicondyle that may be an indication of common extensor tendon pathology. CRITICAL RESULT:   No. COMMUNICATION: Per this written report. Drafted by Dhiraj Croft MD on 4/22/2025 3:52 PM Final report signed by Dhiraj Croft MD on 4/22/2025 3:52 PM      Measures:   Tobacco:   Bia Sims  reports that she has never smoked. She has never been exposed to tobacco smoke. She has never used smokeless tobacco.      Urine Incontinence: Patient reports that she is not currently experiencing any symptoms of urinary incontinence.     Assessment / Plan      Assessment/Plan:   49 y.o. female who presented today for follow up of nephrolithiasis.  She seems to have passed her stone.  She is no longer in pain.  WE discussed fluid intake and stone prevention strategies.  I will see her in 6 weeks with renal sono.      Diagnoses and all orders for this visit:    1. Nephrolithiasis (Primary)  -     US Renal Bilateral; Future         Follow Up:   Return in about 6 weeks (around 6/19/2025) for Recheck.    I spent approximately 30 minutes providing clinical care for this patient; including review of patient's chart and provider documentation, face to face time spent with patient in examination room (obtaining history, performing physical exam, discussing diagnosis and management options), placing orders, and completing patient documentation.     Fatemeh Soto MD  [facility] [place]

## 2025-06-23 ENCOUNTER — HOSPITAL ENCOUNTER (OUTPATIENT)
Dept: ULTRASOUND IMAGING | Facility: HOSPITAL | Age: 50
Discharge: HOME | End: 2025-06-23
Admitting: UROLOGY
Payer: COMMERCIAL

## 2025-06-23 DIAGNOSIS — N20.0 NEPHROLITHIASIS: ICD-10-CM

## 2025-06-23 PROCEDURE — 76775 US EXAM ABDO BACK WALL LIM: CPT

## 2025-06-25 ENCOUNTER — OFFICE VISIT (OUTPATIENT)
Dept: UROLOGY | Facility: CLINIC | Age: 50
End: 2025-06-25
Payer: COMMERCIAL

## 2025-06-25 VITALS — HEIGHT: 62 IN | WEIGHT: 261 LBS | BODY MASS INDEX: 48.03 KG/M2

## 2025-06-25 DIAGNOSIS — N20.0 NEPHROLITHIASIS: Primary | ICD-10-CM

## 2025-06-25 DIAGNOSIS — N20.1 LEFT URETERAL STONE: ICD-10-CM

## 2025-06-25 LAB
BILIRUB BLD-MCNC: NEGATIVE MG/DL
CLARITY, POC: CLEAR
COLOR UR: YELLOW
EXPIRATION DATE: NORMAL
GLUCOSE UR STRIP-MCNC: NEGATIVE MG/DL
KETONES UR QL: NEGATIVE
LEUKOCYTE EST, POC: NEGATIVE
Lab: NORMAL
NITRITE UR-MCNC: NEGATIVE MG/ML
PH UR: 5.5 [PH] (ref 5–8)
PROT UR STRIP-MCNC: NEGATIVE MG/DL
RBC # UR STRIP: NEGATIVE /UL
SP GR UR: 1.02 (ref 1–1.03)
UROBILINOGEN UR QL: NORMAL

## 2025-06-25 PROCEDURE — 81003 URINALYSIS AUTO W/O SCOPE: CPT | Performed by: NURSE PRACTITIONER

## 2025-06-25 PROCEDURE — 99214 OFFICE O/P EST MOD 30 MIN: CPT | Performed by: NURSE PRACTITIONER

## 2025-06-25 RX ORDER — SEMAGLUTIDE 0.25 MG/.5ML
0.25 INJECTION, SOLUTION SUBCUTANEOUS WEEKLY
COMMUNITY
Start: 2025-06-11

## 2025-06-25 NOTE — PROGRESS NOTES
Follow Up Office Visit      Patient Name: Bia Sims  : 1975   MRN: [id number]     Chief Complaint:    Chief Complaint   Patient presents with    Nephrolithiasis       Referring Provider: No ref. provider found    History of Present Illness: Bia Sims is a 50 y.o. female who presents today for 6 weeks follow up of left ureteral stone.  She was last evaluated on 2025 following a 2.5 cm stone in the distal left ureter causing significant back pain, flank pain, hydroureteronephrosis.  Patient did eventually pass her stone secondary to complete resolution of her pain and discomfort.      She is here to review her renal ultrasound which is unremarkable without kidney stone visible, without hydronephrosis or renal mass. No cortical thinning is apparent. There is no perinephric fluid collection evident. A regional aortic abnormality is not delineated.    Patient did have a prior CT showing obstructing ureteral stone that resolved.  She continues to deny any pain or discomfort on clinic evaluation today.  She would like to prevent further stones.  We discussed getting a Litholink with paperwork given to patient.    Subjective      Review of System:   Review of Systems   Constitutional:  Negative for activity change, chills, fatigue and fever.   HENT:  Negative for congestion, sinus pressure and sinus pain.    Eyes:  Negative for discharge and itching.   Respiratory:  Negative for apnea, cough, chest tightness and shortness of breath.    Cardiovascular:  Negative for chest pain and leg swelling.   Gastrointestinal:  Negative for abdominal distention, abdominal pain, nausea and vomiting.   Endocrine: Negative for cold intolerance and heat intolerance.   Genitourinary:  Negative for difficulty urinating, dysuria, flank pain, frequency, hematuria and urgency.   Musculoskeletal:  Negative for back pain.   Skin:  Negative for color change.   Neurological:  Negative for dizziness and headaches.    Psychiatric/Behavioral:  Negative for behavioral problems and confusion. The patient is not nervous/anxious.    All other systems reviewed and are negative.     I have reviewed the ROS documented by my clinical staff, I have updated appropriately and I agree. Griselda Cheng-Akwa, APRN    I have reviewed and the following portions of the patient's history were updated as appropriate: past family history, past medical history, past social history, past surgical history and problem list.    Past Medical History:   Past Medical History:   Diagnosis Date    Breast cancer     Left    Kidney stones     Sleep apnea        Past Surgical History:  Past Surgical History:   Procedure Laterality Date    BREAST SURGERY       SECTION      Baby Delivery    KIDNEY STONE SURGERY      URETEROSCOPY LASER LITHOTRIPSY WITH STENT INSERTION Left 2023    Procedure: CYSTOSCOPY, URETEROSCOPY LASER LITHOTRIPSY;  Surgeon: Fatemeh Soto MD;  Location: [facility];  Service: Urology;  Laterality: Left;       Family History:   family history includes Heart disease in her mother; Kidney disease in her father; Sudden death in her father.   Otherwise pertinent FHx was reviewed and not pertinent to current issue.    Social History:    reports that she has never smoked. She has never been exposed to tobacco smoke. She has never used smokeless tobacco. She reports that she does not drink alcohol and does not use drugs.    Medications:     Current Outpatient Medications:     Diclofenac Sodium (VOLTAREN) 1 % gel gel, Place  on the skin as directed by provider., Disp: , Rfl:     sertraline (ZOLOFT) 25 MG tablet, Take 3 tablets by mouth Daily. (Patient taking differently: Take 2 tablets by mouth Daily.), Disp: , Rfl:     vitamin B-12 (CYANOCOBALAMIN) 1000 MCG tablet, Take 1 tablet by mouth Daily., Disp: , Rfl:     Wegovy 0.25 MG/0.5ML solution auto-injector, Inject 0.5 mL under the skin into the appropriate area as directed 1 (One)  "Time Per Week., Disp: , Rfl:     Allergies:   Allergies   Allergen Reactions    Vancomycin Anaphylaxis    Latex Rash             Objective     Physical Exam:   Vital Signs:   Vitals:    06/25/25 0905   Weight: 118 kg (261 lb)   Height: 157.5 cm (62\")   PainSc: 0-No pain     Body mass index is 47.74 kg/m².     Physical Exam  Constitutional:       General: She is not in acute distress.     Appearance: She is well-developed.   HENT:      Head: Normocephalic and atraumatic.   Eyes:      Pupils: Pupils are equal, round, and reactive to light.   Neck:      Thyroid: No thyromegaly.      Trachea: No tracheal deviation.   Cardiovascular:      Rate and Rhythm: Normal rate and regular rhythm.      Heart sounds: No murmur heard.  Pulmonary:      Effort: Pulmonary effort is normal. No respiratory distress.      Breath sounds: Normal breath sounds. No stridor. No wheezing.   Abdominal:      General: Bowel sounds are normal.      Palpations: Abdomen is soft.      Tenderness: There is no abdominal tenderness.   Genitourinary:     Labia:         Right: No tenderness.         Left: No tenderness.       Vagina: Normal. No vaginal discharge.   Musculoskeletal:         General: No deformity. Normal range of motion.      Cervical back: Normal range of motion.   Skin:     General: Skin is warm and dry.      Coloration: Skin is not pale.      Findings: No erythema or rash.   Neurological:      Mental Status: She is alert and oriented to person, place, and time.      Cranial Nerves: No cranial nerve deficit.      Sensory: No sensory deficit.      Coordination: Coordination normal.   Psychiatric:         Behavior: Behavior normal.         Thought Content: Thought content normal.         Judgment: Judgment normal.         Labs:   Brief Urine Lab Results  (Last result in the past 365 days)        Color   Clarity   Blood   Leuk Est   Nitrite   Protein   CREAT   Urine HCG        06/25/25 0904 Yellow   Clear   Negative   Negative   Negative   " "Negative                        Lab Results   Component Value Date    GLUCOSE 115 (H) 11/28/2023    CALCIUM 9.1 11/28/2023     11/28/2023    K 3.9 11/28/2023    CO2 26.0 11/28/2023     11/28/2023    BUN 9 11/28/2023    CREATININE 0.56 (L) 11/28/2023    BCR 16.1 11/28/2023    ANIONGAP 10.0 11/28/2023       Lab Results   Component Value Date    WBC 5.51 04/01/2025    HGB 13.2 04/01/2025    HCT 40.7 04/01/2025    MCV 91 04/01/2025     04/01/2025       No results found for: \"PSA\"    Images:   US Renal Bilateral  Result Date: 6/24/2025  Impression: Unremarkable renal ultrasound. Electronically Signed: Reina Schroeder MD  6/24/2025 4:03 PM EDT  Workstation ID: [id number]    CT Abdomen Pelvis Stone Protocol  Result Date: 5/2/2025  Impression: Left renal obstructive uropathy due to a 2.5 mm calculus in the distal left ureter. This is similar in location to a larger calculus located in the distal left ureter on prior examination. Electronically Signed: Tennille Page MD  5/2/2025 12:31 PM EDT  Workstation ID: [id number]    XR Shoulder 2+ View Left  Result Date: 4/28/2025  1. Calcific tendinosis in the left subscapularis tendon. 2. Moderate osteoarthritis of the right knee. 3. Mild osteoarthritis of the left knee. CRITICAL RESULT:   No. COMMUNICATION: Per this written report. Drafted by Dhiraj Croft MD on 4/28/2025 4:40 PM Final report signed by Dhiraj Croft MD on 4/28/2025 4:43 PM    XR Knee 4+ View Left  Result Date: 4/28/2025  1. Calcific tendinosis in the left subscapularis tendon. 2. Moderate osteoarthritis of the right knee. 3. Mild osteoarthritis of the left knee. CRITICAL RESULT:   No. COMMUNICATION: Per this written report. Drafted by Dhiraj Croft MD on 4/28/2025 4:40 PM Final report signed by Dhiraj Croft MD on 4/28/2025 4:43 PM    XR Knee 4+ View Left  Result Date: 4/28/2025  1. Calcific tendinosis in the left subscapularis tendon. 2. Moderate osteoarthritis of the right knee. 3. " Mild osteoarthritis of the left knee. CRITICAL RESULT:   No. COMMUNICATION: Per this written report. Drafted by Dhiraj Croft MD on 4/28/2025 4:40 PM Final report signed by Dhiraj Croft MD on 4/28/2025 4:43 PM    XR Elbow 3+ View Right  Result Date: 4/22/2025  Degenerative enthesopathic changes of the lateral epicondyle that may be an indication of common extensor tendon pathology. CRITICAL RESULT:   No. COMMUNICATION: Per this written report. Drafted by Dhiraj Croft MD on 4/22/2025 3:52 PM Final report signed by Dhiraj Croft MD on 4/22/2025 3:52 PM      Measures:   Tobacco:   Bia Sims  reports that she has never smoked. She has never been exposed to tobacco smoke. She has never used smokeless tobacco. I have educated her on the risk of diseases from using tobacco products such as cancer, COPD, and heart disease.     Urine Incontinence: Patient reports that she is not currently experiencing any symptoms of urinary incontinence.       Assessment / Plan      Assessment/Plan:   50 y.o. female who presented today for 6 weeks follow up of Left renal obstructive uropathy due to a 2.5 mm calculus in the distal left ureter noted on CT 04/2025.  She is in no apparent discomfort and reports doing significantly better.      Presents today to r review repeated renal ultrasound which is unremarkable with complete resolution of her prior stone.  NO Hydronephrosis is noted on ultrasound.Patient denies any persistent flank pain, lower back pain, pelvic pressure or suprapubic discomfort.  Denies dysuria or any burning with urination, denies any bouts of gross hematuria.  Has not had any chills or fevers, nausea or vomiting consistent to her prior kidney stones.    EDUCATION: We discussed treatment options for HER BACK/FLANK pain with patient encouraged to continue conservative therapy alternating NSAID/Tylenol as tolerated, Also including hot baths, showers, warm compresses to lower back as tolerated. Also  encouraged walking, physical therapy, light exercises as tolerated    Rest is the most important treatment in the case of BACK/FLANK pain. Rest and physical therapy are enough to improve minor pain. Discussed to monitor his daily routine with prevention of flank pain by: At least Drinking 8 glasses of water per day, Limiting your alcohol consumption.  Having a healthy diet containing fruits, vegetables, and a lot of fluids, Practicing safe sex.  Also maintaining proper hygiene of your body as well as the environment.    Discussed a kidney stone prevention diet to include increasing p.o. fluid intake, to at least 1 to 2 L of water daily.  She is to avoid caffeine products such as cola, coffee, and to avoid soft or soda drinks.  She is to decrease her sodium consumption as in  Fast foods, silver, salted nuts, canned foods, and smoked/cured foods. She is also to decrease her oxalate consumption, as in spinach, Enrique greens, and Rhubarb.  Also important is to decrease protein intake, as in red meats, peanut butter, and also avoid nuts.       PLAN  We resent her urine for culture, will call results if any positive bacterial growth    We discussed getting a Litholink for kidney stone future prevention.  Paperwork given to patient.    She will return in clinic in 3-6 months to review Litholink results    We also repeated CT stone protocol at that time-recurrent kidney stone    She may return to clinic sooner if need be     Patient is Agreeable  WITH plan of care.     Diagnoses and all orders for this visit:    1. Nephrolithiasis (Primary)  -     POC Urinalysis Dipstick, Automated  -     CT Abdomen Pelvis Stone Protocol; Future    2. Left ureteral stone         Follow Up:   Return in about 6 months (around 12/25/2025) for Next scheduled follow up, DR GRANADOS, Review CT Scan/litholink.      GRISELDA CHENG-AKWA, APRN  [facility] [place]

## (undated) DEVICE — GW ZIPWIRE STD/SHFT STR JB/8CM .035X150CM

## (undated) DEVICE — PK CYSTO-TUR BASIC 10

## (undated) DEVICE — GLV SURG BIOGEL LTX PF 7

## (undated) DEVICE — FIBR LASR SOLTIVE BALL/TP 200MICRON 1P/U

## (undated) DEVICE — NITINOL WIRE WITH HYDROPHILIC TIP: Brand: SENSOR